# Patient Record
Sex: MALE | Race: WHITE | Employment: OTHER | URBAN - METROPOLITAN AREA
[De-identification: names, ages, dates, MRNs, and addresses within clinical notes are randomized per-mention and may not be internally consistent; named-entity substitution may affect disease eponyms.]

---

## 2017-01-09 ENCOUNTER — TELEPHONE (OUTPATIENT)
Dept: CARDIOLOGY CLINIC | Age: 72
End: 2017-01-09

## 2017-01-20 ENCOUNTER — TELEPHONE (OUTPATIENT)
Dept: ENT CLINIC | Age: 72
End: 2017-01-20

## 2017-01-20 DIAGNOSIS — J01.01 ACUTE RECURRENT MAXILLARY SINUSITIS: Primary | ICD-10-CM

## 2017-01-20 RX ORDER — CEFDINIR 300 MG/1
300 CAPSULE ORAL 2 TIMES DAILY
Qty: 20 CAPSULE | Refills: 0 | Status: SHIPPED | OUTPATIENT
Start: 2017-01-20 | End: 2017-02-01

## 2017-02-01 ENCOUNTER — TELEPHONE (OUTPATIENT)
Dept: ENT CLINIC | Age: 72
End: 2017-02-01

## 2017-02-01 ENCOUNTER — OFFICE VISIT (OUTPATIENT)
Dept: ENT CLINIC | Age: 72
End: 2017-02-01

## 2017-02-01 VITALS
DIASTOLIC BLOOD PRESSURE: 83 MMHG | HEIGHT: 72 IN | BODY MASS INDEX: 22.21 KG/M2 | RESPIRATION RATE: 18 BRPM | SYSTOLIC BLOOD PRESSURE: 151 MMHG | WEIGHT: 164 LBS

## 2017-02-01 DIAGNOSIS — J45.20 BRONCHITIS, ALLERGIC, MILD INTERMITTENT, UNCOMPLICATED: Primary | ICD-10-CM

## 2017-02-01 PROBLEM — J45.909 BRONCHITIS, ALLERGIC: Status: ACTIVE | Noted: 2017-02-01

## 2017-02-01 PROCEDURE — 1123F ACP DISCUSS/DSCN MKR DOCD: CPT | Performed by: OTOLARYNGOLOGY

## 2017-02-01 PROCEDURE — 99213 OFFICE O/P EST LOW 20 MIN: CPT | Performed by: OTOLARYNGOLOGY

## 2017-02-01 PROCEDURE — G8419 CALC BMI OUT NRM PARAM NOF/U: HCPCS | Performed by: OTOLARYNGOLOGY

## 2017-02-01 PROCEDURE — G8427 DOCREV CUR MEDS BY ELIG CLIN: HCPCS | Performed by: OTOLARYNGOLOGY

## 2017-02-01 PROCEDURE — 3017F COLORECTAL CA SCREEN DOC REV: CPT | Performed by: OTOLARYNGOLOGY

## 2017-02-01 PROCEDURE — 1036F TOBACCO NON-USER: CPT | Performed by: OTOLARYNGOLOGY

## 2017-02-01 PROCEDURE — 4040F PNEUMOC VAC/ADMIN/RCVD: CPT | Performed by: OTOLARYNGOLOGY

## 2017-02-01 PROCEDURE — G8484 FLU IMMUNIZE NO ADMIN: HCPCS | Performed by: OTOLARYNGOLOGY

## 2017-02-01 RX ORDER — METHYLPREDNISOLONE 4 MG/1
4 TABLET ORAL SEE ADMIN INSTRUCTIONS
Qty: 1 KIT | Refills: 0 | Status: SHIPPED | OUTPATIENT
Start: 2017-02-01 | End: 2017-04-19 | Stop reason: ALTCHOICE

## 2017-04-13 ENCOUNTER — HOSPITAL ENCOUNTER (OUTPATIENT)
Dept: OTHER | Age: 72
Discharge: HOME | End: 2017-04-13
Attending: INTERNAL MEDICINE | Admitting: INTERNAL MEDICINE

## 2017-04-13 LAB
A/G RATIO: 1.4 (ref 1.1–2.2)
ALBUMIN SERPL-MCNC: 4.2 G/DL (ref 3.4–5)
ALP BLD-CCNC: 71 U/L (ref 40–129)
ALT SERPL-CCNC: 30 U/L (ref 10–40)
ANION GAP SERPL CALCULATED.3IONS-SCNC: 16 MMOL/L (ref 3–16)
AST SERPL-CCNC: 30 U/L (ref 15–37)
BILIRUB SERPL-MCNC: 0.7 MG/DL (ref 0–1)
BILIRUBIN URINE: NEGATIVE
BLOOD, URINE: NEGATIVE
BUN BLDV-MCNC: 20 MG/DL (ref 7–20)
CALCIUM SERPL-MCNC: 9.3 MG/DL (ref 8.3–10.6)
CHLORIDE BLD-SCNC: 105 MMOL/L (ref 99–110)
CHOLESTEROL, TOTAL: 154 MG/DL (ref 0–199)
CLARITY: CLEAR
CO2: 23 MMOL/L (ref 21–32)
COLOR: YELLOW
CREAT SERPL-MCNC: 0.6 MG/DL (ref 0.8–1.3)
EPITHELIAL CELLS, UA: 0 /HPF (ref 0–5)
GFR AFRICAN AMERICAN: >60
GFR NON-AFRICAN AMERICAN: >60
GLOBULIN: 2.9 G/DL
GLUCOSE BLD-MCNC: 91 MG/DL (ref 70–99)
GLUCOSE URINE: NEGATIVE MG/DL
HCT VFR BLD CALC: 49.5 % (ref 40.5–52.5)
HDLC SERPL-MCNC: 27 MG/DL (ref 40–60)
HEMOGLOBIN: 16.9 G/DL (ref 13.5–17.5)
HYALINE CASTS: 0 /LPF (ref 0–8)
KETONES, URINE: NEGATIVE MG/DL
LDL CHOLESTEROL CALCULATED: 107 MG/DL
LEUKOCYTE ESTERASE, URINE: NEGATIVE
MCH RBC QN AUTO: 31.8 PG (ref 26–34)
MCHC RBC AUTO-ENTMCNC: 34.1 G/DL (ref 31–36)
MCV RBC AUTO: 93.1 FL (ref 80–100)
MICROSCOPIC EXAMINATION: YES
NITRITE, URINE: NEGATIVE
PDW BLD-RTO: 14 % (ref 12.4–15.4)
PH UA: 6
PLATELET # BLD: 146 K/UL (ref 135–450)
PMV BLD AUTO: 10 FL (ref 5–10.5)
POTASSIUM SERPL-SCNC: 4.1 MMOL/L (ref 3.5–5.1)
PROSTATE SPECIFIC ANTIGEN: <0.01 NG/ML (ref 0–4)
PROTEIN UA: 30 MG/DL
RBC # BLD: 5.31 M/UL (ref 4.2–5.9)
RBC UA: 2 /HPF (ref 0–4)
SODIUM BLD-SCNC: 144 MMOL/L (ref 136–145)
SPECIFIC GRAVITY UA: 1.02
TOTAL PROTEIN: 7.1 G/DL (ref 6.4–8.2)
TRIGL SERPL-MCNC: 99 MG/DL (ref 0–150)
URINE TYPE: ABNORMAL
UROBILINOGEN, URINE: 0.2 E.U./DL
VLDLC SERPL CALC-MCNC: 20 MG/DL
WBC # BLD: 3.5 K/UL (ref 4–11)
WBC UA: 2 /HPF (ref 0–5)

## 2017-04-15 LAB — HIV-1 AND HIV-2 ANTIBODIES: REACTIVE

## 2017-04-17 LAB — HIV-1 WESTERN BLOT: NORMAL

## 2017-04-19 ENCOUNTER — OFFICE VISIT (OUTPATIENT)
Dept: CARDIOLOGY CLINIC | Age: 72
End: 2017-04-19

## 2017-04-19 VITALS
SYSTOLIC BLOOD PRESSURE: 112 MMHG | WEIGHT: 165.7 LBS | BODY MASS INDEX: 22.44 KG/M2 | DIASTOLIC BLOOD PRESSURE: 62 MMHG | HEART RATE: 68 BPM | HEIGHT: 72 IN

## 2017-04-19 DIAGNOSIS — I10 ESSENTIAL HYPERTENSION: ICD-10-CM

## 2017-04-19 DIAGNOSIS — E78.2 MIXED HYPERLIPIDEMIA: ICD-10-CM

## 2017-04-19 DIAGNOSIS — R07.89 OTHER CHEST PAIN: Primary | ICD-10-CM

## 2017-04-19 PROCEDURE — G8427 DOCREV CUR MEDS BY ELIG CLIN: HCPCS | Performed by: NURSE PRACTITIONER

## 2017-04-19 PROCEDURE — 1036F TOBACCO NON-USER: CPT | Performed by: NURSE PRACTITIONER

## 2017-04-19 PROCEDURE — 99213 OFFICE O/P EST LOW 20 MIN: CPT | Performed by: NURSE PRACTITIONER

## 2017-04-19 PROCEDURE — 93000 ELECTROCARDIOGRAM COMPLETE: CPT | Performed by: NURSE PRACTITIONER

## 2017-04-19 PROCEDURE — 1123F ACP DISCUSS/DSCN MKR DOCD: CPT | Performed by: NURSE PRACTITIONER

## 2017-04-19 PROCEDURE — 4040F PNEUMOC VAC/ADMIN/RCVD: CPT | Performed by: NURSE PRACTITIONER

## 2017-04-19 PROCEDURE — 3017F COLORECTAL CA SCREEN DOC REV: CPT | Performed by: NURSE PRACTITIONER

## 2017-04-19 PROCEDURE — G8419 CALC BMI OUT NRM PARAM NOF/U: HCPCS | Performed by: NURSE PRACTITIONER

## 2017-04-26 ENCOUNTER — HOSPITAL ENCOUNTER (OUTPATIENT)
Dept: OTHER | Age: 72
Discharge: HOME | End: 2017-04-26
Attending: INTERNAL MEDICINE | Admitting: INTERNAL MEDICINE

## 2017-04-26 LAB — TSH SERPL DL<=0.05 MIU/L-ACNC: 1.02 UIU/ML (ref 0.27–4.2)

## 2017-05-01 LAB
HIV-1 AND HIV-2 ANTIBODIES: NORMAL
HIV-1 WESTERN BLOT: POSITIVE

## 2017-05-02 ENCOUNTER — HOSPITAL ENCOUNTER (OUTPATIENT)
Dept: PHYSICAL THERAPY | Age: 72
Discharge: HOME | End: 2017-05-31
Attending: PHYSICAL MEDICINE & REHABILITATION | Admitting: PHYSICAL MEDICINE & REHABILITATION

## 2017-05-02 DIAGNOSIS — R07.89 OTHER CHEST PAIN: ICD-10-CM

## 2017-05-02 ASSESSMENT — PAIN DESCRIPTION - PAIN TYPE: TYPE: CHRONIC PAIN

## 2017-05-02 ASSESSMENT — PAIN DESCRIPTION - DESCRIPTORS: DESCRIPTORS: ACHING;SHARP;SHOOTING

## 2017-05-02 ASSESSMENT — PAIN DESCRIPTION - ONSET: ONSET: AWAKENED FROM SLEEP

## 2017-05-02 ASSESSMENT — PAIN DESCRIPTION - FREQUENCY: FREQUENCY: CONTINUOUS

## 2017-05-02 ASSESSMENT — PAIN SCALES - GENERAL: PAINLEVEL_OUTOF10: 9

## 2017-05-03 ENCOUNTER — TELEPHONE (OUTPATIENT)
Dept: CARDIOLOGY CLINIC | Age: 72
End: 2017-05-03

## 2017-05-04 ENCOUNTER — OFFICE VISIT (OUTPATIENT)
Dept: ENT CLINIC | Age: 72
End: 2017-05-04

## 2017-05-04 VITALS
WEIGHT: 163 LBS | SYSTOLIC BLOOD PRESSURE: 136 MMHG | DIASTOLIC BLOOD PRESSURE: 81 MMHG | BODY MASS INDEX: 22.08 KG/M2 | HEART RATE: 65 BPM | HEIGHT: 72 IN

## 2017-05-04 DIAGNOSIS — G47.33 OBSTRUCTIVE APNEA: Primary | ICD-10-CM

## 2017-05-04 DIAGNOSIS — J30.9 ALLERGIC SINUSITIS: ICD-10-CM

## 2017-05-04 PROCEDURE — 99213 OFFICE O/P EST LOW 20 MIN: CPT | Performed by: OTOLARYNGOLOGY

## 2017-05-04 PROCEDURE — 3017F COLORECTAL CA SCREEN DOC REV: CPT | Performed by: OTOLARYNGOLOGY

## 2017-05-04 PROCEDURE — 1036F TOBACCO NON-USER: CPT | Performed by: OTOLARYNGOLOGY

## 2017-05-04 PROCEDURE — G8419 CALC BMI OUT NRM PARAM NOF/U: HCPCS | Performed by: OTOLARYNGOLOGY

## 2017-05-04 PROCEDURE — G8427 DOCREV CUR MEDS BY ELIG CLIN: HCPCS | Performed by: OTOLARYNGOLOGY

## 2017-05-04 PROCEDURE — 4040F PNEUMOC VAC/ADMIN/RCVD: CPT | Performed by: OTOLARYNGOLOGY

## 2017-05-04 PROCEDURE — 1123F ACP DISCUSS/DSCN MKR DOCD: CPT | Performed by: OTOLARYNGOLOGY

## 2017-05-04 PROCEDURE — 96372 THER/PROPH/DIAG INJ SC/IM: CPT | Performed by: OTOLARYNGOLOGY

## 2017-05-04 RX ORDER — METHYLPREDNISOLONE ACETATE 40 MG/ML
40 INJECTION, SUSPENSION INTRA-ARTICULAR; INTRALESIONAL; INTRAMUSCULAR; SOFT TISSUE ONCE
Status: COMPLETED | OUTPATIENT
Start: 2017-05-04 | End: 2017-05-04

## 2017-05-04 RX ORDER — MONTELUKAST SODIUM 10 MG/1
10 TABLET ORAL NIGHTLY
Qty: 30 TABLET | Refills: 0 | Status: SHIPPED | OUTPATIENT
Start: 2017-05-04 | End: 2018-04-20 | Stop reason: ALTCHOICE

## 2017-05-04 RX ADMIN — METHYLPREDNISOLONE ACETATE 40 MG: 40 INJECTION, SUSPENSION INTRA-ARTICULAR; INTRALESIONAL; INTRAMUSCULAR; SOFT TISSUE at 10:45

## 2017-05-05 ENCOUNTER — TELEPHONE (OUTPATIENT)
Dept: ENT CLINIC | Age: 72
End: 2017-05-05

## 2017-05-19 ENCOUNTER — HOSPITAL ENCOUNTER (OUTPATIENT)
Dept: OTHER | Age: 72
Discharge: HOME | End: 2017-05-19
Attending: INTERNAL MEDICINE | Admitting: INTERNAL MEDICINE

## 2017-05-22 LAB
HIV RNA PCR INTERPRETATION: DETECTED
HIV-1 RNA BY PCR, QN: 5.1 LOG
HIV-1 RNA BY PCR, QN: ABNORMAL CPY/ML

## 2018-04-20 ENCOUNTER — OFFICE VISIT (OUTPATIENT)
Dept: CARDIOLOGY CLINIC | Age: 73
End: 2018-04-20

## 2018-04-20 VITALS
DIASTOLIC BLOOD PRESSURE: 80 MMHG | SYSTOLIC BLOOD PRESSURE: 132 MMHG | BODY MASS INDEX: 22.62 KG/M2 | HEIGHT: 72 IN | WEIGHT: 167 LBS

## 2018-04-20 DIAGNOSIS — I10 ESSENTIAL HYPERTENSION: Primary | ICD-10-CM

## 2018-04-20 DIAGNOSIS — E78.2 MIXED HYPERLIPIDEMIA: ICD-10-CM

## 2018-04-20 DIAGNOSIS — R42 DIZZINESS: ICD-10-CM

## 2018-04-20 PROCEDURE — 1123F ACP DISCUSS/DSCN MKR DOCD: CPT | Performed by: NURSE PRACTITIONER

## 2018-04-20 PROCEDURE — 99214 OFFICE O/P EST MOD 30 MIN: CPT | Performed by: NURSE PRACTITIONER

## 2018-04-20 PROCEDURE — G8427 DOCREV CUR MEDS BY ELIG CLIN: HCPCS | Performed by: NURSE PRACTITIONER

## 2018-04-20 PROCEDURE — 4040F PNEUMOC VAC/ADMIN/RCVD: CPT | Performed by: NURSE PRACTITIONER

## 2018-04-20 PROCEDURE — G8420 CALC BMI NORM PARAMETERS: HCPCS | Performed by: NURSE PRACTITIONER

## 2018-04-20 PROCEDURE — 93000 ELECTROCARDIOGRAM COMPLETE: CPT | Performed by: NURSE PRACTITIONER

## 2018-04-20 PROCEDURE — 3017F COLORECTAL CA SCREEN DOC REV: CPT | Performed by: NURSE PRACTITIONER

## 2018-04-20 PROCEDURE — 1036F TOBACCO NON-USER: CPT | Performed by: NURSE PRACTITIONER

## 2018-04-20 RX ORDER — AMITRIPTYLINE HYDROCHLORIDE 25 MG/1
25 TABLET, FILM COATED ORAL NIGHTLY
COMMUNITY
End: 2018-07-18

## 2018-04-20 RX ORDER — ZOLPIDEM TARTRATE 5 MG/1
5 TABLET ORAL NIGHTLY PRN
COMMUNITY
End: 2018-07-18

## 2018-04-20 RX ORDER — DICYCLOMINE HYDROCHLORIDE 10 MG/1
10 CAPSULE ORAL PRN
COMMUNITY
End: 2018-07-18

## 2018-05-07 ENCOUNTER — HOSPITAL ENCOUNTER (OUTPATIENT)
Dept: VASCULAR LAB | Age: 73
Discharge: HOME | End: 2018-05-07
Attending: NURSE PRACTITIONER | Admitting: NURSE PRACTITIONER

## 2018-05-07 ENCOUNTER — TELEPHONE (OUTPATIENT)
Dept: CARDIOLOGY CLINIC | Age: 73
End: 2018-05-07

## 2018-05-07 DIAGNOSIS — R42 DIZZINESS: ICD-10-CM

## 2018-05-07 DIAGNOSIS — R42 DIZZINESS AND GIDDINESS: ICD-10-CM

## 2018-05-07 DIAGNOSIS — E78.2 MIXED HYPERLIPIDEMIA: Primary | Chronic | ICD-10-CM

## 2018-05-07 LAB
LEFT VENTRICULAR EJECTION FRACTION HIGH VALUE: 65 %
LEFT VENTRICULAR EJECTION FRACTION MODE: NORMAL
LV EF: 65 %
LVEF MODALITY: NORMAL

## 2018-05-07 RX ORDER — ATORVASTATIN CALCIUM 40 MG/1
40 TABLET, FILM COATED ORAL DAILY
Qty: 30 TABLET | Refills: 3 | Status: SHIPPED | OUTPATIENT
Start: 2018-05-07 | End: 2018-07-18

## 2018-05-09 ENCOUNTER — TELEPHONE (OUTPATIENT)
Dept: CARDIOLOGY CLINIC | Age: 73
End: 2018-05-09

## 2018-07-18 ENCOUNTER — OFFICE VISIT (OUTPATIENT)
Dept: ENT CLINIC | Age: 73
End: 2018-07-18

## 2018-07-18 VITALS — SYSTOLIC BLOOD PRESSURE: 130 MMHG | DIASTOLIC BLOOD PRESSURE: 80 MMHG

## 2018-07-18 DIAGNOSIS — H60.63 CHRONIC INFECTION OF BOTH EXTERNAL EARS: Primary | ICD-10-CM

## 2018-07-18 PROCEDURE — 1123F ACP DISCUSS/DSCN MKR DOCD: CPT | Performed by: OTOLARYNGOLOGY

## 2018-07-18 PROCEDURE — 99213 OFFICE O/P EST LOW 20 MIN: CPT | Performed by: OTOLARYNGOLOGY

## 2018-07-18 PROCEDURE — 1101F PT FALLS ASSESS-DOCD LE1/YR: CPT | Performed by: OTOLARYNGOLOGY

## 2018-07-18 PROCEDURE — 4040F PNEUMOC VAC/ADMIN/RCVD: CPT | Performed by: OTOLARYNGOLOGY

## 2018-07-18 PROCEDURE — 3017F COLORECTAL CA SCREEN DOC REV: CPT | Performed by: OTOLARYNGOLOGY

## 2018-07-18 PROCEDURE — G8427 DOCREV CUR MEDS BY ELIG CLIN: HCPCS | Performed by: OTOLARYNGOLOGY

## 2018-07-18 PROCEDURE — G8420 CALC BMI NORM PARAMETERS: HCPCS | Performed by: OTOLARYNGOLOGY

## 2018-07-18 PROCEDURE — 1036F TOBACCO NON-USER: CPT | Performed by: OTOLARYNGOLOGY

## 2018-07-18 RX ORDER — TRIAMCINOLONE ACETONIDE 1 MG/G
CREAM TOPICAL
Qty: 15 G | Refills: 0 | Status: SHIPPED | OUTPATIENT
Start: 2018-07-18 | End: 2019-10-28

## 2018-07-18 NOTE — PROGRESS NOTES
The past few days, the patient has noted some itching in his left ear but no drainage or pain. His hearing remains the same and he has had no tinnitus or vertigo. The right ear seems to be clear of any discomfort. He is not had this too often in the past.  Currently, he appears in no acute distress. Ear examination the right reveals some mild accumulation of cerumen which is removed the curet. The ear canal skin is rather dry. The tympanic membrane is normal in appearance. On the left side there is significant dryness to the skin which seems to be the cause of the itching. No infection is noted. The tympanic membrane is also normal.  Both ears are cleaned with peroxide and are painted with gentian violet. The oral examination is unremarkable. The nasal mucosa is mildly edematous from allergy but there is no evidence of infection or the neck is free of adenopathy, mass, thyroid enlargement. We will try a course of triamcinolone cream to be used at night on a regular basis for approximately 1 month and then be used when he needs it.

## 2018-09-27 PROBLEM — R42 DIZZINESS: Status: ACTIVE | Noted: 2018-09-27

## 2018-09-27 NOTE — PROGRESS NOTES
[address]       H+P // CONSULT // OUTPATIENT VISIT // Td Gonzalez     Referring Doctor Nicolasa Salcido MD   Encounter Type Followup     CHIEF COMPLAINT     VisitType [] Acute [x] Chronic     Symptom [x] None [] CP [] SOB [] Dizzy [] Palps [] Fatigue     Problems Dizzy, HTN, CHOL      HISTORY OF PRESENT ILLNESS     Doing well. Denies cp, sob. Compliant with meds. Symptom Y N Frequency Duration Severity Modify Assoc Sx Other   CP [] [x]         SOB [] [x]         Dizzy [] [x]         Syncope [] [x]         Palpitations [] [x]           COMPLIANCE     Category Meds Diet Salt Exercise Tobacco Alcohol Drugs   Compliant [x] [x] [x] [x] [x] [x] [x]   [x]Counseling given on all above above categories    HISTORY/ALLERGIES/ROS     MedHx:  has a past medical history of Chronic kidney disease; Dizziness, nonspecific; Gall stones; Heart murmur; Hyperlipidemia; Hypertension; Prostate cancer ([facility] [address] and Unspecified sleep apnea. SurgHx:  has a past surgical history that includes Prostatectomy; Neck surgery; Tonsillectomy and adenoidectomy; Colonoscopy; Vasectomy; Upper gastrointestinal endoscopy; and penile prosthesis (12/17/13). SocHx:   reports that he has never smoked. He has never used smokeless tobacco. He reports that he does not drink alcohol or use drugs. FamHx: family history includes Heart Disease in his father and paternal grandmother. Allergies: Simvastatin and Pristiq [desvenlafaxine]   ROS:  [x]Full ROS obtained and negative except as mentioned in HPI     MEDICATIONS      Current Outpatient Prescriptions   Medication Sig Dispense Refill    triamcinolone (KENALOG) 0.1 % cream Apply topically daily. Apply with Q tip in each ear at bedtime 15 g 0     No current facility-administered medications for this visit.       Reviewed with patient and will remain unchanged except as mentioned in A/P  PHYSICAL EXAM     Vitals:    10/01/18 0827   BP: 120/80   Pulse: 61   Resp: 16   SpO2: 97%      Gen

## 2018-10-01 ENCOUNTER — OFFICE VISIT (OUTPATIENT)
Dept: CARDIOLOGY CLINIC | Age: 73
End: 2018-10-01
Payer: MEDICARE

## 2018-10-01 VITALS
BODY MASS INDEX: 22.21 KG/M2 | SYSTOLIC BLOOD PRESSURE: 120 MMHG | RESPIRATION RATE: 16 BRPM | HEART RATE: 61 BPM | HEIGHT: 72 IN | WEIGHT: 164 LBS | OXYGEN SATURATION: 97 % | DIASTOLIC BLOOD PRESSURE: 80 MMHG

## 2018-10-01 DIAGNOSIS — R42 DIZZINESS: Primary | ICD-10-CM

## 2018-10-01 DIAGNOSIS — E78.00 HYPERCHOLESTEREMIA: ICD-10-CM

## 2018-10-01 DIAGNOSIS — I10 ESSENTIAL HYPERTENSION: ICD-10-CM

## 2018-10-01 PROCEDURE — G8420 CALC BMI NORM PARAMETERS: HCPCS | Performed by: INTERNAL MEDICINE

## 2018-10-01 PROCEDURE — G8427 DOCREV CUR MEDS BY ELIG CLIN: HCPCS | Performed by: INTERNAL MEDICINE

## 2018-10-01 PROCEDURE — 99214 OFFICE O/P EST MOD 30 MIN: CPT | Performed by: INTERNAL MEDICINE

## 2018-10-01 PROCEDURE — 1036F TOBACCO NON-USER: CPT | Performed by: INTERNAL MEDICINE

## 2018-10-01 PROCEDURE — 3017F COLORECTAL CA SCREEN DOC REV: CPT | Performed by: INTERNAL MEDICINE

## 2018-10-01 PROCEDURE — 4040F PNEUMOC VAC/ADMIN/RCVD: CPT | Performed by: INTERNAL MEDICINE

## 2018-10-01 PROCEDURE — 1101F PT FALLS ASSESS-DOCD LE1/YR: CPT | Performed by: INTERNAL MEDICINE

## 2018-10-01 PROCEDURE — G8484 FLU IMMUNIZE NO ADMIN: HCPCS | Performed by: INTERNAL MEDICINE

## 2018-10-01 PROCEDURE — 1123F ACP DISCUSS/DSCN MKR DOCD: CPT | Performed by: INTERNAL MEDICINE

## 2018-10-01 NOTE — COMMUNICATION BODY
[address]       H+P // CONSULT // OUTPATIENT VISIT // Teresa Harrison     Referring Doctor Marianna Ryan MD   Encounter Type Followup     CHIEF COMPLAINT     VisitType [] Acute [x] Chronic     Symptom [x] None [] CP [] SOB [] Dizzy [] Palps [] Fatigue     Problems Dizzy, HTN, CHOL      HISTORY OF PRESENT ILLNESS     Doing well. Denies cp, sob. Compliant with meds. Symptom Y N Frequency Duration Severity Modify Assoc Sx Other   CP [] [x]         SOB [] [x]         Dizzy [] [x]         Syncope [] [x]         Palpitations [] [x]           COMPLIANCE     Category Meds Diet Salt Exercise Tobacco Alcohol Drugs   Compliant [x] [x] [x] [x] [x] [x] [x]   [x]Counseling given on all above above categories    HISTORY/ALLERGIES/ROS     MedHx:  has a past medical history of Chronic kidney disease; Dizziness, nonspecific; Gall stones; Heart murmur; Hyperlipidemia; Hypertension; Prostate cancer ([facility] [address] and Unspecified sleep apnea. SurgHx:  has a past surgical history that includes Prostatectomy; Neck surgery; Tonsillectomy and adenoidectomy; Colonoscopy; Vasectomy; Upper gastrointestinal endoscopy; and penile prosthesis (12/17/13). SocHx:   reports that he has never smoked. He has never used smokeless tobacco. He reports that he does not drink alcohol or use drugs. FamHx: family history includes Heart Disease in his father and paternal grandmother. Allergies: Simvastatin and Pristiq [desvenlafaxine]   ROS:  [x]Full ROS obtained and negative except as mentioned in HPI     MEDICATIONS      Current Outpatient Prescriptions   Medication Sig Dispense Refill    triamcinolone (KENALOG) 0.1 % cream Apply topically daily. Apply with Q tip in each ear at bedtime 15 g 0     No current facility-administered medications for this visit.       Reviewed with patient and will remain unchanged except as mentioned in A/P  PHYSICAL EXAM     Vitals:    10/01/18 0827   BP: 120/80   Pulse: 61   Resp: 16   SpO2: 97%      Gen

## 2019-09-27 ENCOUNTER — HOSPITAL ENCOUNTER (OUTPATIENT)
Age: 74
Discharge: HOME | End: 2019-09-27
Payer: MEDICARE

## 2019-09-27 LAB
A/G RATIO: 1.6 (ref 1.1–2.2)
ALBUMIN SERPL-MCNC: 4.7 G/DL (ref 3.4–5)
ALP BLD-CCNC: 67 U/L (ref 40–129)
ALT SERPL-CCNC: 23 U/L (ref 10–40)
ANION GAP SERPL CALCULATED.3IONS-SCNC: 15 MMOL/L (ref 3–16)
AST SERPL-CCNC: 25 U/L (ref 15–37)
BILIRUB SERPL-MCNC: 0.7 MG/DL (ref 0–1)
BILIRUBIN URINE: NEGATIVE
BLOOD, URINE: NEGATIVE
BUN BLDV-MCNC: 19 MG/DL (ref 7–20)
CALCIUM SERPL-MCNC: 9.6 MG/DL (ref 8.3–10.6)
CHLORIDE BLD-SCNC: 105 MMOL/L (ref 99–110)
CHOLESTEROL, TOTAL: 213 MG/DL (ref 0–199)
CLARITY: CLEAR
CO2: 25 MMOL/L (ref 21–32)
COLOR: YELLOW
CREAT SERPL-MCNC: 0.9 MG/DL (ref 0.8–1.3)
EPITHELIAL CELLS, UA: 0 /HPF (ref 0–5)
GFR AFRICAN AMERICAN: >60
GFR NON-AFRICAN AMERICAN: >60
GLOBULIN: 3 G/DL
GLUCOSE BLD-MCNC: 85 MG/DL (ref 70–99)
GLUCOSE URINE: NEGATIVE MG/DL
HCT VFR BLD CALC: 49.3 % (ref 40.5–52.5)
HDLC SERPL-MCNC: 35 MG/DL (ref 40–60)
HEMOGLOBIN: 17.1 G/DL (ref 13.5–17.5)
HYALINE CASTS: 1 /LPF (ref 0–8)
KETONES, URINE: NEGATIVE MG/DL
LDL CHOLESTEROL CALCULATED: 150 MG/DL
LEUKOCYTE ESTERASE, URINE: NEGATIVE
MCH RBC QN AUTO: 34.6 PG (ref 26–34)
MCHC RBC AUTO-ENTMCNC: 34.7 G/DL (ref 31–36)
MCV RBC AUTO: 99.7 FL (ref 80–100)
MICROSCOPIC EXAMINATION: YES
NITRITE, URINE: NEGATIVE
PDW BLD-RTO: 14.3 % (ref 12.4–15.4)
PH UA: 5.5 (ref 5–8)
PLATELET # BLD: 171 K/UL (ref 135–450)
PMV BLD AUTO: 9.2 FL (ref 5–10.5)
POTASSIUM SERPL-SCNC: 4.4 MMOL/L (ref 3.5–5.1)
PROSTATE SPECIFIC ANTIGEN: 0.02 NG/ML (ref 0–4)
PROTEIN UA: ABNORMAL MG/DL
RBC # BLD: 4.95 M/UL (ref 4.2–5.9)
RBC UA: 3 /HPF (ref 0–4)
SODIUM BLD-SCNC: 145 MMOL/L (ref 136–145)
SPECIFIC GRAVITY UA: 1.03 (ref 1–1.03)
TOTAL PROTEIN: 7.7 G/DL (ref 6.4–8.2)
TRIGL SERPL-MCNC: 141 MG/DL (ref 0–150)
URINE TYPE: ABNORMAL
UROBILINOGEN, URINE: 0.2 E.U./DL
VITAMIN D 25-HYDROXY: 36 NG/ML
VLDLC SERPL CALC-MCNC: 28 MG/DL
WBC # BLD: 4.2 K/UL (ref 4–11)
WBC UA: 1 /HPF (ref 0–5)

## 2019-09-27 PROCEDURE — 36415 COLL VENOUS BLD VENIPUNCTURE: CPT

## 2019-09-27 PROCEDURE — 82306 VITAMIN D 25 HYDROXY: CPT

## 2019-09-27 PROCEDURE — 80061 LIPID PANEL: CPT

## 2019-09-27 PROCEDURE — 81001 URINALYSIS AUTO W/SCOPE: CPT

## 2019-09-27 PROCEDURE — 80053 COMPREHEN METABOLIC PANEL: CPT

## 2019-09-27 PROCEDURE — 85027 COMPLETE CBC AUTOMATED: CPT

## 2019-09-27 PROCEDURE — 84153 ASSAY OF PSA TOTAL: CPT

## 2019-10-11 ENCOUNTER — OFFICE VISIT (OUTPATIENT)
Dept: CARDIOLOGY CLINIC | Age: 74
End: 2019-10-11
Payer: MEDICARE

## 2019-10-11 VITALS
HEIGHT: 72 IN | OXYGEN SATURATION: 97 % | RESPIRATION RATE: 18 BRPM | BODY MASS INDEX: 21.4 KG/M2 | HEART RATE: 68 BPM | WEIGHT: 158 LBS | DIASTOLIC BLOOD PRESSURE: 78 MMHG | SYSTOLIC BLOOD PRESSURE: 124 MMHG

## 2019-10-11 DIAGNOSIS — I10 ESSENTIAL HYPERTENSION: ICD-10-CM

## 2019-10-11 DIAGNOSIS — R42 DIZZINESS: Primary | ICD-10-CM

## 2019-10-11 DIAGNOSIS — E78.00 HYPERCHOLESTEREMIA: ICD-10-CM

## 2019-10-11 PROCEDURE — 99214 OFFICE O/P EST MOD 30 MIN: CPT | Performed by: INTERNAL MEDICINE

## 2019-10-11 PROCEDURE — 1036F TOBACCO NON-USER: CPT | Performed by: INTERNAL MEDICINE

## 2019-10-11 PROCEDURE — G8484 FLU IMMUNIZE NO ADMIN: HCPCS | Performed by: INTERNAL MEDICINE

## 2019-10-11 PROCEDURE — G8420 CALC BMI NORM PARAMETERS: HCPCS | Performed by: INTERNAL MEDICINE

## 2019-10-11 PROCEDURE — G8427 DOCREV CUR MEDS BY ELIG CLIN: HCPCS | Performed by: INTERNAL MEDICINE

## 2019-10-11 PROCEDURE — 4040F PNEUMOC VAC/ADMIN/RCVD: CPT | Performed by: INTERNAL MEDICINE

## 2019-10-11 PROCEDURE — 3017F COLORECTAL CA SCREEN DOC REV: CPT | Performed by: INTERNAL MEDICINE

## 2019-10-11 PROCEDURE — 1123F ACP DISCUSS/DSCN MKR DOCD: CPT | Performed by: INTERNAL MEDICINE

## 2019-10-11 RX ORDER — ZOLPIDEM TARTRATE 10 MG/1
TABLET ORAL
COMMUNITY
Start: 2019-09-27

## 2019-10-11 RX ORDER — ESOMEPRAZOLE MAGNESIUM 40 MG/1
CAPSULE, DELAYED RELEASE ORAL
COMMUNITY
Start: 2019-09-27

## 2019-10-11 RX ORDER — HYDROXYZINE HYDROCHLORIDE 25 MG/1
TABLET, FILM COATED ORAL
COMMUNITY
Start: 2019-09-27

## 2019-10-11 RX ORDER — DICYCLOMINE HYDROCHLORIDE 10 MG/1
CAPSULE ORAL
COMMUNITY
Start: 2019-01-03

## 2019-10-16 ENCOUNTER — HOSPITAL ENCOUNTER (OUTPATIENT)
Dept: CT IMAGING | Age: 74
Discharge: HOME | End: 2019-10-16
Payer: MEDICARE

## 2019-10-16 DIAGNOSIS — R42 POSTURAL DIZZINESS WITH NEAR SYNCOPE: ICD-10-CM

## 2019-10-16 DIAGNOSIS — R55 POSTURAL DIZZINESS WITH NEAR SYNCOPE: ICD-10-CM

## 2019-10-16 PROCEDURE — 6360000004 HC RX CONTRAST MEDICATION: Performed by: INTERNAL MEDICINE

## 2019-10-16 PROCEDURE — 70498 CT ANGIOGRAPHY NECK: CPT

## 2019-10-16 RX ADMIN — IOPAMIDOL 75 ML: 755 INJECTION, SOLUTION INTRAVENOUS at 07:16

## 2019-10-28 ENCOUNTER — OFFICE VISIT (OUTPATIENT)
Dept: ENT CLINIC | Age: 74
End: 2019-10-28
Payer: MEDICARE

## 2019-10-28 VITALS — DIASTOLIC BLOOD PRESSURE: 72 MMHG | SYSTOLIC BLOOD PRESSURE: 114 MMHG | HEART RATE: 60 BPM | OXYGEN SATURATION: 96 %

## 2019-10-28 DIAGNOSIS — H60.8X2 CHRONIC ECZEMATOUS OTITIS EXTERNA OF LEFT EAR: Primary | ICD-10-CM

## 2019-10-28 PROCEDURE — 99213 OFFICE O/P EST LOW 20 MIN: CPT | Performed by: OTOLARYNGOLOGY

## 2019-10-28 PROCEDURE — G8427 DOCREV CUR MEDS BY ELIG CLIN: HCPCS | Performed by: OTOLARYNGOLOGY

## 2019-10-28 PROCEDURE — G8420 CALC BMI NORM PARAMETERS: HCPCS | Performed by: OTOLARYNGOLOGY

## 2019-10-28 PROCEDURE — 3017F COLORECTAL CA SCREEN DOC REV: CPT | Performed by: OTOLARYNGOLOGY

## 2019-10-28 PROCEDURE — 1036F TOBACCO NON-USER: CPT | Performed by: OTOLARYNGOLOGY

## 2019-10-28 PROCEDURE — G8484 FLU IMMUNIZE NO ADMIN: HCPCS | Performed by: OTOLARYNGOLOGY

## 2019-10-28 PROCEDURE — 1123F ACP DISCUSS/DSCN MKR DOCD: CPT | Performed by: OTOLARYNGOLOGY

## 2019-10-28 PROCEDURE — 4040F PNEUMOC VAC/ADMIN/RCVD: CPT | Performed by: OTOLARYNGOLOGY

## 2019-10-28 RX ORDER — TRIAMCINOLONE ACETONIDE 1 MG/G
CREAM TOPICAL
Qty: 15 G | Refills: 1 | Status: SHIPPED | OUTPATIENT
Start: 2019-10-28

## 2020-04-21 ENCOUNTER — HOSPITAL ENCOUNTER (OUTPATIENT)
Dept: CT IMAGING | Age: 75
Discharge: HOME | End: 2020-04-21
Payer: MEDICARE

## 2020-04-21 LAB
A/G RATIO: 1.7 (ref 1.1–2.2)
ALBUMIN SERPL-MCNC: 4.5 G/DL (ref 3.4–5)
ALP BLD-CCNC: 79 U/L (ref 40–129)
ALT SERPL-CCNC: 22 U/L (ref 10–40)
ANION GAP SERPL CALCULATED.3IONS-SCNC: 9 MMOL/L (ref 3–16)
AST SERPL-CCNC: 20 U/L (ref 15–37)
BASOPHILS ABSOLUTE: 0 K/UL (ref 0–0.2)
BASOPHILS RELATIVE PERCENT: 0.5 %
BILIRUB SERPL-MCNC: 1.2 MG/DL (ref 0–1)
BUN BLDV-MCNC: 19 MG/DL (ref 7–20)
CALCIUM SERPL-MCNC: 9.6 MG/DL (ref 8.3–10.6)
CHLORIDE BLD-SCNC: 104 MMOL/L (ref 99–110)
CO2: 28 MMOL/L (ref 21–32)
CREAT SERPL-MCNC: 0.9 MG/DL (ref 0.8–1.3)
EOSINOPHILS ABSOLUTE: 0.1 K/UL (ref 0–0.6)
EOSINOPHILS RELATIVE PERCENT: 0.9 %
GFR AFRICAN AMERICAN: >60
GFR NON-AFRICAN AMERICAN: >60
GLOBULIN: 2.7 G/DL
GLUCOSE BLD-MCNC: 106 MG/DL (ref 70–99)
HCT VFR BLD CALC: 49.4 % (ref 40.5–52.5)
HEMOGLOBIN: 17 G/DL (ref 13.5–17.5)
LIPASE: 27 U/L (ref 13–60)
LYMPHOCYTES ABSOLUTE: 1.5 K/UL (ref 1–5.1)
LYMPHOCYTES RELATIVE PERCENT: 17.2 %
MAGNESIUM: 2.2 MG/DL (ref 1.8–2.4)
MCH RBC QN AUTO: 34.2 PG (ref 26–34)
MCHC RBC AUTO-ENTMCNC: 34.5 G/DL (ref 31–36)
MCV RBC AUTO: 99.2 FL (ref 80–100)
MONOCYTES ABSOLUTE: 0.7 K/UL (ref 0–1.3)
MONOCYTES RELATIVE PERCENT: 8.2 %
NEUTROPHILS ABSOLUTE: 6.5 K/UL (ref 1.7–7.7)
NEUTROPHILS RELATIVE PERCENT: 73.2 %
PDW BLD-RTO: 14.3 % (ref 12.4–15.4)
PLATELET # BLD: 184 K/UL (ref 135–450)
PMV BLD AUTO: 8.7 FL (ref 5–10.5)
POTASSIUM SERPL-SCNC: 4.9 MMOL/L (ref 3.5–5.1)
RBC # BLD: 4.98 M/UL (ref 4.2–5.9)
SODIUM BLD-SCNC: 141 MMOL/L (ref 136–145)
TOTAL PROTEIN: 7.2 G/DL (ref 6.4–8.2)
WBC # BLD: 8.9 K/UL (ref 4–11)

## 2020-04-21 PROCEDURE — 74177 CT ABD & PELVIS W/CONTRAST: CPT

## 2020-04-21 PROCEDURE — 83690 ASSAY OF LIPASE: CPT

## 2020-04-21 PROCEDURE — 6360000004 HC RX CONTRAST MEDICATION: Performed by: INTERNAL MEDICINE

## 2020-04-21 PROCEDURE — 80053 COMPREHEN METABOLIC PANEL: CPT

## 2020-04-21 PROCEDURE — 83735 ASSAY OF MAGNESIUM: CPT

## 2020-04-21 PROCEDURE — 36415 COLL VENOUS BLD VENIPUNCTURE: CPT

## 2020-04-21 PROCEDURE — 85025 COMPLETE CBC W/AUTO DIFF WBC: CPT

## 2020-04-21 RX ADMIN — IOPAMIDOL 75 ML: 755 INJECTION, SOLUTION INTRAVENOUS at 12:57

## 2020-04-21 RX ADMIN — IOHEXOL 50 ML: 240 INJECTION, SOLUTION INTRATHECAL; INTRAVASCULAR; INTRAVENOUS; ORAL at 12:57

## 2020-08-27 ENCOUNTER — HOSPITAL ENCOUNTER (OUTPATIENT)
Age: 75
Discharge: HOME | End: 2020-08-27
Payer: MEDICARE

## 2020-08-27 LAB
A/G RATIO: 1.8 (ref 1.1–2.2)
ALBUMIN SERPL-MCNC: 4.6 G/DL (ref 3.4–5)
ALP BLD-CCNC: 57 U/L (ref 40–129)
ALT SERPL-CCNC: 17 U/L (ref 10–40)
ANION GAP SERPL CALCULATED.3IONS-SCNC: 10 MMOL/L (ref 3–16)
AST SERPL-CCNC: 20 U/L (ref 15–37)
BILIRUB SERPL-MCNC: 0.6 MG/DL (ref 0–1)
BILIRUBIN URINE: NEGATIVE
BLOOD, URINE: NEGATIVE
BUN BLDV-MCNC: 16 MG/DL (ref 7–20)
CALCIUM SERPL-MCNC: 9.6 MG/DL (ref 8.3–10.6)
CHLORIDE BLD-SCNC: 107 MMOL/L (ref 99–110)
CHOLESTEROL, TOTAL: 205 MG/DL (ref 0–199)
CLARITY: CLEAR
CO2: 27 MMOL/L (ref 21–32)
COLOR: YELLOW
CREAT SERPL-MCNC: 0.8 MG/DL (ref 0.8–1.3)
EPITHELIAL CELLS, UA: 0 /HPF (ref 0–5)
GFR AFRICAN AMERICAN: >60
GFR NON-AFRICAN AMERICAN: >60
GLOBULIN: 2.6 G/DL
GLUCOSE BLD-MCNC: 97 MG/DL (ref 70–99)
GLUCOSE URINE: NEGATIVE MG/DL
HCT VFR BLD CALC: 48.8 % (ref 40.5–52.5)
HDLC SERPL-MCNC: 39 MG/DL (ref 40–60)
HEMOGLOBIN: 16.4 G/DL (ref 13.5–17.5)
HYALINE CASTS: 0 /LPF (ref 0–8)
KETONES, URINE: NEGATIVE MG/DL
LDL CHOLESTEROL CALCULATED: 146 MG/DL
LEUKOCYTE ESTERASE, URINE: NEGATIVE
MCH RBC QN AUTO: 34.1 PG (ref 26–34)
MCHC RBC AUTO-ENTMCNC: 33.7 G/DL (ref 31–36)
MCV RBC AUTO: 101.3 FL (ref 80–100)
MICROSCOPIC EXAMINATION: YES
NITRITE, URINE: NEGATIVE
PDW BLD-RTO: 14.4 % (ref 12.4–15.4)
PH UA: 5.5 (ref 5–8)
PLATELET # BLD: 175 K/UL (ref 135–450)
PMV BLD AUTO: 9.2 FL (ref 5–10.5)
POTASSIUM SERPL-SCNC: 5.1 MMOL/L (ref 3.5–5.1)
PROSTATE SPECIFIC ANTIGEN: <0.01 NG/ML (ref 0–4)
PROTEIN UA: ABNORMAL MG/DL
RBC # BLD: 4.81 M/UL (ref 4.2–5.9)
RBC UA: 3 /HPF (ref 0–4)
SODIUM BLD-SCNC: 144 MMOL/L (ref 136–145)
SPECIFIC GRAVITY UA: 1.02 (ref 1–1.03)
TOTAL PROTEIN: 7.2 G/DL (ref 6.4–8.2)
TRIGL SERPL-MCNC: 101 MG/DL (ref 0–150)
URINE TYPE: ABNORMAL
UROBILINOGEN, URINE: 0.2 E.U./DL
VITAMIN D 25-HYDROXY: 44 NG/ML
VLDLC SERPL CALC-MCNC: 20 MG/DL
WBC # BLD: 4.4 K/UL (ref 4–11)
WBC UA: 1 /HPF (ref 0–5)

## 2020-08-27 PROCEDURE — 80053 COMPREHEN METABOLIC PANEL: CPT

## 2020-08-27 PROCEDURE — 82306 VITAMIN D 25 HYDROXY: CPT

## 2020-08-27 PROCEDURE — 80061 LIPID PANEL: CPT

## 2020-08-27 PROCEDURE — 84153 ASSAY OF PSA TOTAL: CPT

## 2020-08-27 PROCEDURE — 85027 COMPLETE CBC AUTOMATED: CPT

## 2020-08-27 PROCEDURE — 81001 URINALYSIS AUTO W/SCOPE: CPT

## 2020-10-22 NOTE — PROGRESS NOTES
[address]     H+P // CONSULT // OUTPATIENT VISIT // Mario Emmanuel     Referring Doctor Henry Rodriguez MD   Encounter Type Followup     CHIEF COMPLAINT     Visit Type Chronic   Symptoms None   Problems Dizziness, HTN, CHOL     HISTORY OF PRESENT ILLNESS      GEN - Doing well. No cp, sob.  Dizziness - improved, CT neck neg.  HTN - Ambulatory BP readings in good range. No HA or dizziness.  CHOL - Last cholesterol reviewed and in good range. Declines statin   MED - Compliant with CV meds listed below without notable side effects. HISTORY/ALLERGIES/ROS     MedHx:  has a past medical history of Chronic kidney disease, Dizziness, nonspecific, Gall stones, Heart murmur, Hyperlipidemia, Hypertension, Prostate cancer ([facility] [address] and Unspecified sleep apnea. SurgHx:  has a past surgical history that includes Prostatectomy; Neck surgery; Tonsillectomy and adenoidectomy; Colonoscopy; Vasectomy; Upper gastrointestinal endoscopy; and penile prosthesis (12/17/13). SocHx:  reports that he has never smoked. He has never used smokeless tobacco. He reports that he does not drink alcohol or use drugs. FamHx: family history includes Heart Disease in his father and paternal grandmother. Allerg: Simvastatin and Pristiq [desvenlafaxine]   ROS: [x]Full ROS obtained and negative except as mentioned in HPI     MEDICATIONS      Current Outpatient Medications   Medication Sig Dispense Refill    triamcinolone (KENALOG) 0.1 % cream Apply topically  Daily. Apply with Q tip in AS nightly 15 g 1    esomeprazole (NEXIUM) 40 MG delayed release capsule       Abacavir-Dolutegravir-Lamivud (TRIUMEQ) 600- MG TABS TAKE 1 TABLET BY MOUTH  EVERY DAY      zolpidem (AMBIEN) 10 MG tablet       hydrOXYzine (ATARAX) 25 MG tablet       dicyclomine (BENTYL) 10 MG capsule TK 1 C PO BID PRN       No current facility-administered medications for this visit.       Reviewed with patient and will remain unchanged except as mentioned in A/P  PHYSICAL EXAM     Vitals:    10/29/20 1411   BP: 130/62   Pulse: 67   SpO2: 96%      Gen Alert, coop, no distress Heart  Rrr, no mrg   Head NC, AT, no abnorm Abd  Soft, NT, +BS, no mass, no OM   Eyes PER, conj/corn clear Ext  Ext nl, AT, no C/C/E   Nose Nares nl, no drain, NT Pulse 2+ and symmetric   Throat Lips, mucosa, tongue nl Skin Col/text/turg nl, no vis rash/les   Neck S/S, TM, NT, no bruit/JVD Psych Nl mood and affect   Lung CTA-B, unlabored, no DTP Lymph   No cervical or axillary LA   Ch wall NT, no deform Neuro  Nl gross M/S exam     ASSESSMENT AND PLAN     *Dizziness/visual changes/right facial numbness  Status Resolved, ECHO: 5/18 EF: 65%  Plan: Continued observation  *HTN  Status Controlled  Plan Counseled on diet/salt/exercise/weight, continue meds at doses above  *CHOL  Status  Uncontrolled with last LDL of 146 (goal <70) and HDL of 39 (8/20)  Plan Counseled on diet/exercise/weight, lipid/liver surveillance per PCP  *COMPLIANCE  Status Compliant  Plan Discussed importance of compliance with meds/diet/salt/exercise; avoid tob/alc/drugs; patient verbalized understanding  *FOLLOWUP  12 months    [address] Linda John, am scribing for and in the presence of Nathaniel Morel MD.   Linda Perkins 10/22/20 8:50 AM   Provider Jane Peterson is working as a scribe for and in the presence of me (Nathaniel Morel MD). Working as a scribe, Linda Joy may have prepopulated components of this note with my historical  intellectual property under my direct supervision. Any additions to this intellectual property were performed in my presence and at my direction. Furthermore, the content and accuracy of this note have been reviewed by me Nathaniel Morel MD).   10/29/2020 8:21 AM

## 2020-10-29 ENCOUNTER — OFFICE VISIT (OUTPATIENT)
Dept: CARDIOLOGY CLINIC | Age: 75
End: 2020-10-29
Payer: MEDICARE

## 2020-10-29 VITALS
SYSTOLIC BLOOD PRESSURE: 130 MMHG | WEIGHT: 154 LBS | HEIGHT: 72 IN | DIASTOLIC BLOOD PRESSURE: 62 MMHG | HEART RATE: 67 BPM | OXYGEN SATURATION: 96 % | BODY MASS INDEX: 20.86 KG/M2

## 2020-10-29 PROCEDURE — 1036F TOBACCO NON-USER: CPT | Performed by: INTERNAL MEDICINE

## 2020-10-29 PROCEDURE — 4040F PNEUMOC VAC/ADMIN/RCVD: CPT | Performed by: INTERNAL MEDICINE

## 2020-10-29 PROCEDURE — 3017F COLORECTAL CA SCREEN DOC REV: CPT | Performed by: INTERNAL MEDICINE

## 2020-10-29 PROCEDURE — G8427 DOCREV CUR MEDS BY ELIG CLIN: HCPCS | Performed by: INTERNAL MEDICINE

## 2020-10-29 PROCEDURE — 1123F ACP DISCUSS/DSCN MKR DOCD: CPT | Performed by: INTERNAL MEDICINE

## 2020-10-29 PROCEDURE — G8420 CALC BMI NORM PARAMETERS: HCPCS | Performed by: INTERNAL MEDICINE

## 2020-10-29 PROCEDURE — 99213 OFFICE O/P EST LOW 20 MIN: CPT | Performed by: INTERNAL MEDICINE

## 2020-10-29 PROCEDURE — G8484 FLU IMMUNIZE NO ADMIN: HCPCS | Performed by: INTERNAL MEDICINE

## 2020-11-12 ENCOUNTER — TELEPHONE (OUTPATIENT)
Dept: ENT CLINIC | Age: 75
End: 2020-11-12

## 2020-11-12 ENCOUNTER — OFFICE VISIT (OUTPATIENT)
Dept: ENT CLINIC | Age: 75
End: 2020-11-12
Payer: MEDICARE

## 2020-11-12 VITALS
HEIGHT: 72 IN | OXYGEN SATURATION: 98 % | SYSTOLIC BLOOD PRESSURE: 160 MMHG | DIASTOLIC BLOOD PRESSURE: 74 MMHG | TEMPERATURE: 97.8 F | WEIGHT: 156 LBS | HEART RATE: 60 BPM | BODY MASS INDEX: 21.13 KG/M2

## 2020-11-12 PROCEDURE — 99213 OFFICE O/P EST LOW 20 MIN: CPT | Performed by: OTOLARYNGOLOGY

## 2020-11-12 PROCEDURE — 3017F COLORECTAL CA SCREEN DOC REV: CPT | Performed by: OTOLARYNGOLOGY

## 2020-11-12 PROCEDURE — 96372 THER/PROPH/DIAG INJ SC/IM: CPT | Performed by: OTOLARYNGOLOGY

## 2020-11-12 PROCEDURE — G8427 DOCREV CUR MEDS BY ELIG CLIN: HCPCS | Performed by: OTOLARYNGOLOGY

## 2020-11-12 PROCEDURE — 4040F PNEUMOC VAC/ADMIN/RCVD: CPT | Performed by: OTOLARYNGOLOGY

## 2020-11-12 PROCEDURE — G8484 FLU IMMUNIZE NO ADMIN: HCPCS | Performed by: OTOLARYNGOLOGY

## 2020-11-12 PROCEDURE — 1123F ACP DISCUSS/DSCN MKR DOCD: CPT | Performed by: OTOLARYNGOLOGY

## 2020-11-12 PROCEDURE — 1036F TOBACCO NON-USER: CPT | Performed by: OTOLARYNGOLOGY

## 2020-11-12 PROCEDURE — G8420 CALC BMI NORM PARAMETERS: HCPCS | Performed by: OTOLARYNGOLOGY

## 2020-11-12 RX ORDER — METHYLPREDNISOLONE ACETATE 40 MG/ML
40 INJECTION, SUSPENSION INTRA-ARTICULAR; INTRALESIONAL; INTRAMUSCULAR; SOFT TISSUE ONCE
Status: COMPLETED | OUTPATIENT
Start: 2020-11-12 | End: 2020-11-12

## 2020-11-12 RX ORDER — MONTELUKAST SODIUM 10 MG/1
10 TABLET ORAL NIGHTLY
Qty: 15 TABLET | Refills: 1 | Status: SHIPPED | OUTPATIENT
Start: 2020-11-12

## 2020-11-12 RX ADMIN — METHYLPREDNISOLONE ACETATE 40 MG: 40 INJECTION, SUSPENSION INTRA-ARTICULAR; INTRALESIONAL; INTRAMUSCULAR; SOFT TISSUE at 10:45

## 2020-11-12 NOTE — PROGRESS NOTES
Patient has been having a problem with gastric reflux for which she is on Nexium therapy as well as elevating his head of his bed at night. He has been doing reasonably well but is now having excessive amounts of drainage which likely is contributing to his current problem. He has had sinus problems in the past.  This problem has been occurring over the past several weeks. He has had no fever. No purulent drainage or nasal obstruction has been noted. There is no shortness of breath noted as well. He does not smoke. Currently, he appears in no acute distress. Ear examination reveals some cerumen present on the right side removed with a curette. The tympanic membranes on both sides appear normal and the ear canals are both now clear. Oral examination is unremarkable. Indirect laryngoscopy is the neck is negative except for some mild inflammation in the posterior arytenoid area likely related to the reflux. No other abnormalities are apparent. The neck is free of adenopathy, mass, thyroid enlargement. Nasal mucosa treated with cotton pledgets  impregnated with Afrin and lidocaine placed in middle meatuses against turbinates. Pledgets left in place for five minutes and removed enabling enhanced visualization. The exam revealed positive edema of mucosa. it was negative for erythema indicative of infection. There was not evidence of deviation of the septum which was not significant. There were not polyps present. .There were not other masses present. The turbinates were not enlarged beyond normal.  Findings are consistent with some allergic sinusitis likely producing the drainage causing his discomfort. We will try a course of Singulair and asked that he use his Flonase nasal spray daily at least for the next 2 weeks. A Depo-Medrol injection will be given and I have asked that he contact me in 2 weeks to determine his response.

## 2020-11-12 NOTE — TELEPHONE ENCOUNTER
PT was in office to see Dr Rolando Nicole today was prescribed Singulair. He went to the [address] [place] [address] to  the medication and was told that he medication was not supposed to be filled until tomorrow. PT thought is was to start the medication tonight. PT would like to have a return call regarding this issue.    #[phone]

## 2020-11-13 ENCOUNTER — TELEPHONE (OUTPATIENT)
Dept: ENT CLINIC | Age: 75
End: 2020-11-13

## 2020-11-13 NOTE — TELEPHONE ENCOUNTER
Patient did  some [employer] nasal spray    He wants to confirm his directions,   Did he say 2 puffs  in each nostril once  Day? Or one in each nostril twice a day ? please advise

## 2021-06-23 ENCOUNTER — OFFICE VISIT (OUTPATIENT)
Dept: ENT CLINIC | Age: 76
End: 2021-06-23
Payer: MEDICARE

## 2021-06-23 VITALS — TEMPERATURE: 97.1 F | DIASTOLIC BLOOD PRESSURE: 92 MMHG | SYSTOLIC BLOOD PRESSURE: 175 MMHG | HEART RATE: 62 BPM

## 2021-06-23 DIAGNOSIS — H61.21 IMPACTED CERUMEN OF RIGHT EAR: Primary | ICD-10-CM

## 2021-06-23 PROCEDURE — G8420 CALC BMI NORM PARAMETERS: HCPCS | Performed by: OTOLARYNGOLOGY

## 2021-06-23 PROCEDURE — 3017F COLORECTAL CA SCREEN DOC REV: CPT | Performed by: OTOLARYNGOLOGY

## 2021-06-23 PROCEDURE — 1036F TOBACCO NON-USER: CPT | Performed by: OTOLARYNGOLOGY

## 2021-06-23 PROCEDURE — 4040F PNEUMOC VAC/ADMIN/RCVD: CPT | Performed by: OTOLARYNGOLOGY

## 2021-06-23 PROCEDURE — 1123F ACP DISCUSS/DSCN MKR DOCD: CPT | Performed by: OTOLARYNGOLOGY

## 2021-06-23 PROCEDURE — 99213 OFFICE O/P EST LOW 20 MIN: CPT | Performed by: OTOLARYNGOLOGY

## 2021-06-23 PROCEDURE — G8427 DOCREV CUR MEDS BY ELIG CLIN: HCPCS | Performed by: OTOLARYNGOLOGY

## 2021-06-23 NOTE — PROGRESS NOTES
Patient has had some irritation in the right ear more recently. He has had no fever and no change in hearing. He has had no problems breathing and has had no vertigo. No fevers been noted. He continues to be a non-smoker. Currently, he has no obvious acute distress. Ear examination on the right reveals some mild cerumen occlusion which is removed with a curette. Ear now is normal with a normal-appearing tympanic membrane. The opposite ear is entirely normal.  Oral examination is unremarkable. The nasal mucosa is mildly edematous consistent with some allergy but there is no evidence of purulent drainage nor obstruction. The neck remains free of any adenopathy, mass, thyroid enlargement. He will return as needed. No further treatment is necessary at this time.

## 2021-10-22 NOTE — PROGRESS NOTES
[address]    H+P // Shivam Holbrook // OUTPATIENT VISIT // Tasneem Greene MD   ENC TYPE Followup     CHIEF COMPLAINT     TYPE Chronic   SX None   PROBS HTN, CHOL     HISTORY OF PRESENT ILLNESS     GEN Doing well. No new concerns. Wife  breast cancer several months ago. HTN Ambulatory BP in good range, no ha or dizziness. CHOL Last chol in good range. Declines statin   MED Compliant with CV meds listed below without reported side effects. HISTORY/ALLERGIES/ROS     MedHx:  has a past medical history of Chronic kidney disease, Dizziness, nonspecific, Gall stones, Heart murmur, Hyperlipidemia, Hypertension, Prostate cancer ([facility] [address] and Unspecified sleep apnea. SurgHx:  has a past surgical history that includes Prostatectomy; Neck surgery; Tonsillectomy and adenoidectomy; Colonoscopy; Vasectomy; Upper gastrointestinal endoscopy; and penile prosthesis (13). SocHx:  reports that he has never smoked. He has never used smokeless tobacco. He reports that he does not drink alcohol and does not use drugs. FamHx: family history includes Heart Disease in his father and paternal grandmother. Allerg: Simvastatin and Pristiq [desvenlafaxine]   ROS: [x]Full ROS obtained and negative except as mentioned in HPI     MEDICATIONS      Current Outpatient Medications   Medication Sig Dispense Refill    montelukast (SINGULAIR) 10 MG tablet Take 1 tablet by mouth nightly 15 tablet 1    triamcinolone (KENALOG) 0.1 % cream Apply topically  Daily. Apply with Q tip in AS nightly 15 g 1    esomeprazole (NEXIUM) 40 MG delayed release capsule       Abacavir-Dolutegravir-Lamivud (TRIUMEQ) 600- MG TABS TAKE 1 TABLET BY MOUTH  EVERY DAY      zolpidem (AMBIEN) 10 MG tablet       hydrOXYzine (ATARAX) 25 MG tablet       dicyclomine (BENTYL) 10 MG capsule TK 1 C PO BID PRN       No current facility-administered medications for this visit.      Reviewed with patient and will remain unchanged except as mentioned in A/P  PHYSICAL EXAM     Vitals:    11/04/21 1341   BP: 128/70   Pulse: 70   SpO2: 98%      Gen Alert, coop, no distress Heart  Rrr, no mrg   Head NC, AT, no abnorm Abd  Soft, NT, +BS, no mass, no OM   Eyes PER, conj/corn clear Ext  Ext nl, AT, no C/C/E   Nose Nares nl, no drain, NT Pulse 2+ and symmetric   Throat Lips, mucosa, tongue nl Skin Col/text/turg nl, no vis rash/les   Neck S/S, TM, NT, no bruit/JVD Psych Nl mood and affect   Lung CTA-B, unlabored, no DTP Lymph   No cervical or axillary LA   Ch wall NT, no deform Neuro  Nl gross M/S exam     ASSESSMENT AND PLAN     *HTN  Status Controlled  TTE 5/18: 65%  Plan Counseled on diet/salt/exercise/weight, continue meds at doses above  *CHOL  Status  Uncontrolled with last LDL of 123  Plan Counseled on diet/exercise/weight, lipid/liver surveillance per PCP  *COMPLIANCE  Status Compliant  Plan Discussed importance of compliance with meds/diet/salt/exercise; avoid tob/alc/drugs; patient verbalized understanding  *FOLLOWUP  12 months    Scribe Attestation  I, Lella Dakin, am scribing for and in the presence of Manisha Sanders MD.   Signed, Lella Dakin, RN. Provider Jeny Greer is working as a scribe for and in the presence of winnie Sanders MD). Working as a scribe, Berna Henao may have prepopulated components of this note with my historical  intellectual property under my direct supervision. Any additions to this intellectual property were performed in my presence and at my direction. Furthermore, the content and accuracy of this note have been reviewed by me Manisha Sanders MD).   11/4/2021 1:09 PM     CODING     Category Diagnosis   Stable chronic illness  (49266/24269 - 2 or more) Htn, chol   Chronic illness with: Exac, progr or SA of Tx  (79848/77108 - 1 or more)    Undiagnosed new problem with: uncertain prognosis  (92569/95834 - 1 or more)    Acute illness with systemic Sx  (30754/57159 - 1 or more) Acute, complicated injury  (20499/52058 - 1 or more)    91878 1 or more chronic illness with exacerbation, progression or SA of treatment    Time  30-39 minutes spent preparing to see patient including reviewing patient history/prior tests/prior consults, performing a medical exam, counseling and educating patient/family/caregiver, ordering medications/tests/procedures, referring and communicating with PCPs and other pertinent consultants, documenting information in the EMR, independently interpreting results and communicating to family and coordination of patient care.

## 2021-10-22 NOTE — PATIENT INSTRUCTIONS
No changes today  Everything looks good - watch diet to help with cholesterol numbers  Follow up in 1 year unless you need anything sooner

## 2021-11-04 ENCOUNTER — OFFICE VISIT (OUTPATIENT)
Dept: CARDIOLOGY CLINIC | Age: 76
End: 2021-11-04
Payer: MEDICARE

## 2021-11-04 ENCOUNTER — HOSPITAL ENCOUNTER (OUTPATIENT)
Age: 76
Discharge: HOME | End: 2021-11-04
Payer: MEDICARE

## 2021-11-04 VITALS
OXYGEN SATURATION: 98 % | DIASTOLIC BLOOD PRESSURE: 70 MMHG | BODY MASS INDEX: 19.77 KG/M2 | SYSTOLIC BLOOD PRESSURE: 128 MMHG | WEIGHT: 146 LBS | HEART RATE: 70 BPM | HEIGHT: 72 IN

## 2021-11-04 DIAGNOSIS — I10 ESSENTIAL HYPERTENSION: Primary | ICD-10-CM

## 2021-11-04 LAB
ANION GAP SERPL CALCULATED.3IONS-SCNC: 16 MMOL/L (ref 3–16)
BUN BLDV-MCNC: 20 MG/DL (ref 7–20)
CALCIUM SERPL-MCNC: 9.9 MG/DL (ref 8.3–10.6)
CHLORIDE BLD-SCNC: 104 MMOL/L (ref 99–110)
CO2: 24 MMOL/L (ref 21–32)
CREAT SERPL-MCNC: 0.9 MG/DL (ref 0.8–1.3)
GFR AFRICAN AMERICAN: >60
GFR NON-AFRICAN AMERICAN: >60
GLUCOSE BLD-MCNC: 88 MG/DL (ref 70–99)
MAGNESIUM: 2.2 MG/DL (ref 1.8–2.4)
POTASSIUM SERPL-SCNC: 5.1 MMOL/L (ref 3.5–5.1)
SODIUM BLD-SCNC: 144 MMOL/L (ref 136–145)
VITAMIN D 25-HYDROXY: 76.1 NG/ML

## 2021-11-04 PROCEDURE — G8420 CALC BMI NORM PARAMETERS: HCPCS | Performed by: INTERNAL MEDICINE

## 2021-11-04 PROCEDURE — 4040F PNEUMOC VAC/ADMIN/RCVD: CPT | Performed by: INTERNAL MEDICINE

## 2021-11-04 PROCEDURE — 3017F COLORECTAL CA SCREEN DOC REV: CPT | Performed by: INTERNAL MEDICINE

## 2021-11-04 PROCEDURE — 36415 COLL VENOUS BLD VENIPUNCTURE: CPT

## 2021-11-04 PROCEDURE — G8484 FLU IMMUNIZE NO ADMIN: HCPCS | Performed by: INTERNAL MEDICINE

## 2021-11-04 PROCEDURE — 99214 OFFICE O/P EST MOD 30 MIN: CPT | Performed by: INTERNAL MEDICINE

## 2021-11-04 PROCEDURE — 83735 ASSAY OF MAGNESIUM: CPT

## 2021-11-04 PROCEDURE — 1036F TOBACCO NON-USER: CPT | Performed by: INTERNAL MEDICINE

## 2021-11-04 PROCEDURE — 80048 BASIC METABOLIC PNL TOTAL CA: CPT

## 2021-11-04 PROCEDURE — 1123F ACP DISCUSS/DSCN MKR DOCD: CPT | Performed by: INTERNAL MEDICINE

## 2021-11-04 PROCEDURE — G8427 DOCREV CUR MEDS BY ELIG CLIN: HCPCS | Performed by: INTERNAL MEDICINE

## 2021-11-04 PROCEDURE — 82306 VITAMIN D 25 HYDROXY: CPT

## 2022-12-21 NOTE — PROGRESS NOTES
[facility]  H+P  Consult  OP Visit  FU Visit   CC HX HPI   GEN  Doing well. No new concerns. HTN  Ambulatory BP in good range. Ø HA/dizziness. MED  Compliant with CV meds. Ø reported SA. HISTORY/ALLERGY/ROS   MEDHx  has a past medical history of Chronic kidney disease, Dizziness, nonspecific, Gall stones, Heart murmur, Hyperlipidemia, Hypertension, Prostate cancer ([address] and Unspecified sleep apnea. SURGHx  has a past surgical history that includes Prostatectomy; Neck surgery; Tonsillectomy and adenoidectomy; Colonoscopy; Vasectomy; Upper gastrointestinal endoscopy; and Penile prosthesis implant (12/17/13). SOCHx  reports that he has never smoked. He has never used smokeless tobacco. He reports that he does not drink alcohol and does not use drugs. FAMHx family history includes Heart Disease in his father and paternal grandmother. ALLERG Simvastatin and Pristiq [desvenlafaxine]   ROS Full ROS obtained and negative except as mentioned in HPI   MEDICATIONS   Current Outpatient Medications   Medication Sig Dispense Refill    montelukast (SINGULAIR) 10 MG tablet Take 1 tablet by mouth nightly 15 tablet 1    triamcinolone (KENALOG) 0.1 % cream Apply topically  Daily. Apply with Q tip in AS nightly 15 g 1    esomeprazole (NEXIUM) 40 MG delayed release capsule       Abacavir-Dolutegravir-Lamivud (TRIUMEQ) 600- MG TABS TAKE 1 TABLET BY MOUTH  EVERY DAY      zolpidem (AMBIEN) 10 MG tablet       hydrOXYzine (ATARAX) 25 MG tablet       dicyclomine (BENTYL) 10 MG capsule TK 1 C PO BID PRN       No current facility-administered medications for this visit.       PHYSICAL EXAM   Vitals /80 (Site: Right Upper Arm, Position: Sitting, Cuff Size: Medium Adult)   Pulse 74   Ht 6' (1.829 m)   Wt 155 lb 6.4 oz (70.5 kg)   SpO2 97%   BMI 21.08 kg/m²     Gen Alert, coop, no distress Heart  Rrr, no mrg   Head NC, AT, no abnorm Abd  Soft, NT, +BS, no mass, no OM   Eyes PER, conj/corn clear Ext Ext nl, AT, no C/C/E   Nose Nares nl, no drain, NT Pulse 2+ and symmetric   Throat Lips, mucosa, tongue nl Skin Col/text/turg nl, no vis rash/les   Neck S/S, TM, NT, no bruit/JVD Psych Nl mood and affect   Lung CTA-B, unlabored, no DTP Lymph   No cervical or axillary LA   Ch wall NT, no deform Neuro  Nl gross M/S exam      CODING   SCI (28186) - HTN  30-39 minutes preparing to see pt including review hx, tests, consults, perf exam, , educating pt, fam, caregiver, ordering meds/tests/procedures, referring and comm with pcps and other consultants, documenting info in EMR, interpreting results and communicating to fam and coordination of pt care. SCRIBE   Nurse - Scribe Attestation  STANLEY [employer], am scribing for and in the presence of Keyona Dhaliwal MD.   Signed, [employer], RN. Doctor - Provider Shai Hoang is working as a scribe for and in the presence of winnie Dhaliwal MD). Working as a scribe, [employer] may have prepopulated components of this note with my historical  intellectual property under my direct supervision. Any additions to this intellectual property were performed in my presence and at my direction.   Furthermore, the content and accuracy of this note have been reviewed by winnie Dhaliwal MD).  12/22/2022 8:49 AM   ASSESSMENT AND PLAN   *HTN  Status Controlled   Plan Counseled on diet/salt/exercise/weight, continue meds at doses above   *COMPLIANCE  Status Compliant   Plan Discussed compliance with meds/diet/salt/exercise; avoid tob/alc/drugs   *FOLLOWUP  12 months

## 2022-12-22 ENCOUNTER — OFFICE VISIT (OUTPATIENT)
Dept: CARDIOLOGY CLINIC | Age: 77
End: 2022-12-22

## 2022-12-22 VITALS
SYSTOLIC BLOOD PRESSURE: 136 MMHG | OXYGEN SATURATION: 97 % | HEIGHT: 72 IN | DIASTOLIC BLOOD PRESSURE: 80 MMHG | BODY MASS INDEX: 21.05 KG/M2 | HEART RATE: 74 BPM | WEIGHT: 155.4 LBS

## 2022-12-22 DIAGNOSIS — I10 ESSENTIAL HYPERTENSION: Primary | ICD-10-CM

## 2022-12-22 DIAGNOSIS — E78.00 HYPERCHOLESTEROLEMIA: ICD-10-CM

## 2022-12-27 NOTE — TELEPHONE ENCOUNTER
[phone] (home)   Call to pt, pt ok to wait till tomorrow Spoke with patient.  Patient informed naltrexone has been denied because drug is not covered/plan exclusion.  Patient states she paid for prescription out of pocket.  Per patient, she will start medication once her respiratory symptoms improve.     Will close this encounter.

## 2023-06-02 ENCOUNTER — HOSPITAL ENCOUNTER (OUTPATIENT)
Age: 78
Discharge: HOME | End: 2023-06-02
Payer: MEDICARE

## 2023-06-02 LAB
25(OH)D3 SERPL-MCNC: 55.4 NG/ML
ANION GAP SERPL CALCULATED.3IONS-SCNC: 10 MMOL/L (ref 3–16)
BUN SERPL-MCNC: 15 MG/DL (ref 7–20)
CALCIUM SERPL-MCNC: 9.6 MG/DL (ref 8.3–10.6)
CHLORIDE SERPL-SCNC: 105 MMOL/L (ref 99–110)
CO2 SERPL-SCNC: 26 MMOL/L (ref 21–32)
CREAT SERPL-MCNC: 0.8 MG/DL (ref 0.8–1.3)
GFR SERPLBLD CREATININE-BSD FMLA CKD-EPI: >60 ML/MIN/{1.73_M2}
GLUCOSE SERPL-MCNC: 121 MG/DL (ref 70–99)
MAGNESIUM SERPL-MCNC: 2.8 MG/DL (ref 1.8–2.4)
POTASSIUM SERPL-SCNC: 4.8 MMOL/L (ref 3.5–5.1)
SODIUM SERPL-SCNC: 141 MMOL/L (ref 136–145)

## 2023-06-02 PROCEDURE — 82306 VITAMIN D 25 HYDROXY: CPT

## 2023-06-02 PROCEDURE — 80048 BASIC METABOLIC PNL TOTAL CA: CPT

## 2023-06-02 PROCEDURE — 83735 ASSAY OF MAGNESIUM: CPT

## 2023-06-02 PROCEDURE — 36415 COLL VENOUS BLD VENIPUNCTURE: CPT

## 2023-08-24 ENCOUNTER — HOSPITAL ENCOUNTER (OUTPATIENT)
Age: 78
Discharge: HOME | End: 2023-08-24
Payer: MEDICARE

## 2023-08-24 LAB
25(OH)D3 SERPL-MCNC: 62.9 NG/ML
ALBUMIN SERPL-MCNC: 4.4 G/DL (ref 3.4–5)
ALBUMIN/GLOB SERPL: 1.4 {RATIO} (ref 1.1–2.2)
ALP SERPL-CCNC: 77 U/L (ref 40–129)
ALT SERPL-CCNC: 23 U/L (ref 10–40)
ANION GAP SERPL CALCULATED.3IONS-SCNC: 11 MMOL/L (ref 3–16)
AST SERPL-CCNC: 20 U/L (ref 15–37)
BACTERIA URNS QL MICRO: NORMAL /HPF
BILIRUB SERPL-MCNC: 0.6 MG/DL (ref 0–1)
BILIRUB UR QL STRIP.AUTO: NEGATIVE
BUN SERPL-MCNC: 17 MG/DL (ref 7–20)
CALCIUM SERPL-MCNC: 9.8 MG/DL (ref 8.3–10.6)
CHLORIDE SERPL-SCNC: 105 MMOL/L (ref 99–110)
CHOLEST SERPL-MCNC: 214 MG/DL (ref 0–199)
CLARITY UR: CLEAR
CO2 SERPL-SCNC: 28 MMOL/L (ref 21–32)
COLOR UR: YELLOW
CREAT SERPL-MCNC: 0.9 MG/DL (ref 0.8–1.3)
DEPRECATED RDW RBC AUTO: 13.9 % (ref 12.4–15.4)
EPI CELLS #/AREA URNS AUTO: 0 /HPF (ref 0–5)
EST. AVERAGE GLUCOSE BLD GHB EST-MCNC: 96.8 MG/DL
GFR SERPLBLD CREATININE-BSD FMLA CKD-EPI: >60 ML/MIN/{1.73_M2}
GLUCOSE SERPL-MCNC: 91 MG/DL (ref 70–99)
GLUCOSE UR STRIP.AUTO-MCNC: NEGATIVE MG/DL
HBA1C MFR BLD: 5 %
HCT VFR BLD AUTO: 48.5 % (ref 40.5–52.5)
HDLC SERPL-MCNC: 33 MG/DL (ref 40–60)
HGB BLD-MCNC: 17 G/DL (ref 13.5–17.5)
HGB UR QL STRIP.AUTO: NEGATIVE
HYALINE CASTS #/AREA URNS AUTO: 1 /LPF (ref 0–8)
KETONES UR STRIP.AUTO-MCNC: ABNORMAL MG/DL
LDLC SERPL CALC-MCNC: 152 MG/DL
LEUKOCYTE ESTERASE UR QL STRIP.AUTO: NEGATIVE
MCH RBC QN AUTO: 34.5 PG (ref 26–34)
MCHC RBC AUTO-ENTMCNC: 35 G/DL (ref 31–36)
MCV RBC AUTO: 98.4 FL (ref 80–100)
NITRITE UR QL STRIP.AUTO: NEGATIVE
PH UR STRIP.AUTO: 6 [PH] (ref 5–8)
PLATELET # BLD AUTO: 247 K/UL (ref 135–450)
PMV BLD AUTO: 8.9 FL (ref 5–10.5)
POTASSIUM SERPL-SCNC: 4.9 MMOL/L (ref 3.5–5.1)
PROT SERPL-MCNC: 7.5 G/DL (ref 6.4–8.2)
PROT UR STRIP.AUTO-MCNC: 30 MG/DL
PSA SERPL DL<=0.01 NG/ML-MCNC: <0.01 NG/ML (ref 0–4)
RBC # BLD AUTO: 4.93 M/UL (ref 4.2–5.9)
RBC CLUMPS #/AREA URNS AUTO: 2 /HPF (ref 0–4)
SODIUM SERPL-SCNC: 144 MMOL/L (ref 136–145)
SP GR UR STRIP.AUTO: 1.02 (ref 1–1.03)
TRIGL SERPL-MCNC: 145 MG/DL (ref 0–150)
UA DIPSTICK W REFLEX MICRO PNL UR: YES
URN SPEC COLLECT METH UR: ABNORMAL
UROBILINOGEN UR STRIP-ACNC: 1 E.U./DL
VLDLC SERPL CALC-MCNC: 29 MG/DL
WBC # BLD AUTO: 5 K/UL (ref 4–11)
WBC #/AREA URNS AUTO: 1 /HPF (ref 0–5)

## 2023-08-24 PROCEDURE — 82306 VITAMIN D 25 HYDROXY: CPT

## 2023-08-24 PROCEDURE — 85027 COMPLETE CBC AUTOMATED: CPT

## 2023-08-24 PROCEDURE — 80061 LIPID PANEL: CPT

## 2023-08-24 PROCEDURE — 81001 URINALYSIS AUTO W/SCOPE: CPT

## 2023-08-24 PROCEDURE — 80053 COMPREHEN METABOLIC PANEL: CPT

## 2023-08-24 PROCEDURE — 83036 HEMOGLOBIN GLYCOSYLATED A1C: CPT

## 2023-08-24 PROCEDURE — 84153 ASSAY OF PSA TOTAL: CPT

## 2023-09-14 ENCOUNTER — PROCEDURE VISIT (OUTPATIENT)
Dept: NEUROLOGY | Age: 78
End: 2023-09-14
Payer: MEDICARE

## 2023-09-14 DIAGNOSIS — R20.0 BILATERAL LEG NUMBNESS: Primary | ICD-10-CM

## 2023-09-14 PROCEDURE — 95886 MUSC TEST DONE W/N TEST COMP: CPT | Performed by: PSYCHIATRY & NEUROLOGY

## 2023-09-14 PROCEDURE — 95909 NRV CNDJ TST 5-6 STUDIES: CPT | Performed by: PSYCHIATRY & NEUROLOGY

## 2023-09-14 NOTE — PROGRESS NOTES
Yesy Tinoco M.D. [facility]) Physicians/[facility] [place] [facility]  Board Certified in [address], [address] [facility], [address] [facility]    EMG / NERVE CONDUCTION STUDY      PATIENT:  Abbie Rodriguez       DATE OF EM23     YOB: 1945       REASON FOR EMG:   Bilateral leg numbness      REFERRING PHYSICIAN:  Sonia Silva DPM  [address] [facility],  [address] [facility]     SUMMARY:   Bilateral peroneal motor nerve studies with slow conduction velocities. The left peroneal motor nerve study also had a low amplitude. Bilateral posterior tibial motor nerve studies with slow conduction velocities. The left sural sensory nerve study and the right superficial peroneal sensory nerve study were not recordable. Needle EMG of several muscles in both lower extremities was normal.        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 ---------------------------------------------  Yesy Tinoco M.D.   Electromyographer / Neurologist

## 2024-05-19 ENCOUNTER — OFFICE VISIT (OUTPATIENT)
Age: 79
End: 2024-05-19

## 2024-05-19 VITALS
HEART RATE: 60 BPM | SYSTOLIC BLOOD PRESSURE: 148 MMHG | OXYGEN SATURATION: 95 % | BODY MASS INDEX: 20.72 KG/M2 | TEMPERATURE: 97.7 F | HEIGHT: 72 IN | DIASTOLIC BLOOD PRESSURE: 88 MMHG | WEIGHT: 153 LBS

## 2024-05-19 DIAGNOSIS — M79.642 PAIN IN BOTH HANDS: Primary | ICD-10-CM

## 2024-05-19 DIAGNOSIS — M79.641 PAIN IN BOTH HANDS: Primary | ICD-10-CM

## 2024-05-28 ENCOUNTER — HOSPITAL ENCOUNTER (OUTPATIENT)
Age: 79
Discharge: HOME | End: 2024-05-28
Payer: MEDICARE

## 2024-05-28 ENCOUNTER — HOSPITAL ENCOUNTER (OUTPATIENT)
Dept: GENERAL RADIOLOGY | Age: 79
Discharge: HOME | End: 2024-05-28
Payer: MEDICARE

## 2024-05-28 ENCOUNTER — OFFICE VISIT (OUTPATIENT)
Age: 79
End: 2024-05-28

## 2024-05-28 VITALS
WEIGHT: 153 LBS | HEIGHT: 72 IN | RESPIRATION RATE: 17 BRPM | HEART RATE: 57 BPM | TEMPERATURE: 98.3 F | DIASTOLIC BLOOD PRESSURE: 91 MMHG | BODY MASS INDEX: 20.72 KG/M2 | OXYGEN SATURATION: 95 % | SYSTOLIC BLOOD PRESSURE: 151 MMHG

## 2024-05-28 DIAGNOSIS — S16.1XXA STRAIN OF NECK MUSCLE, INITIAL ENCOUNTER: Primary | ICD-10-CM

## 2024-05-28 DIAGNOSIS — S16.1XXA STRAIN OF NECK MUSCLE, INITIAL ENCOUNTER: ICD-10-CM

## 2024-05-28 PROCEDURE — 72040 X-RAY EXAM NECK SPINE 2-3 VW: CPT

## 2024-05-28 RX ORDER — NAPROXEN 500 MG/1
500 TABLET ORAL 2 TIMES DAILY WITH MEALS
Qty: 14 TABLET | Refills: 0 | Status: SHIPPED | OUTPATIENT
Start: 2024-05-28 | End: 2024-06-04

## 2024-05-28 NOTE — PROGRESS NOTES
Arvind Buchanan (:  1945) is a 78 y.o. male,Established patient, here for evaluation of the following chief complaint(s):  Neck Pain (Fell on this past Saturday , was in darkness and fell to the right side, and with neck pain and bothering him off and on )      ASSESSMENT/PLAN:  1. Strain of neck muscle, initial encounter  - CHG RADEX SPINE CERVICAL 2 OR 3 VIEWS  - naproxen (NAPROSYN) 500 MG tablet; Take 1 tablet by mouth 2 times daily (with meals) for 7 days  Dispense: 14 tablet; Refill: 0       Return if symptoms worsen or fail to improve.    SUBJECTIVE/OBJECTIVE:  PRESENT TO CLINIC WITH NECK DISCOMFORT HAPPENED AFTER FALL FOUR DAYS AGO. NO LOSS OF CONSCIOUSNESS.       History provided by:  Patient  Neck Pain         Vitals:    24 0921   BP: (!) 151/91   Site: Left Upper Arm   Position: Sitting   Cuff Size: Medium Adult   Pulse: 57   Resp: 17   Temp: 98.3 °F (36.8 °C)   SpO2: 95%   Weight: 69.4 kg (153 lb)   Height: 1.829 m (6')       Review of Systems   Musculoskeletal:  Positive for neck pain.       Physical Exam  Constitutional:       Appearance: Normal appearance.   HENT:      Head: Normocephalic and atraumatic.      Nose: Nose normal.      Mouth/Throat:      Mouth: Mucous membranes are moist.   Eyes:      Pupils: Pupils are equal, round, and reactive to light.   Neck:      Comments: LIMITED RANGE OF MOTION  Pulmonary:      Effort: Pulmonary effort is normal.      Breath sounds: Normal breath sounds.   Musculoskeletal:         General: Normal range of motion.      Cervical back: Tenderness present. No rigidity.   Neurological:      Mental Status: He is alert and oriented to person, place, and time.   Psychiatric:         Mood and Affect: Mood normal.           An electronic signature was used to authenticate this note.    --Vidhya Aguilera DO

## 2024-06-06 ENCOUNTER — PROCEDURE VISIT (OUTPATIENT)
Dept: NEUROLOGY | Age: 79
End: 2024-06-06

## 2024-06-06 DIAGNOSIS — R20.0 NUMBNESS AND TINGLING OF RIGHT HAND: Primary | ICD-10-CM

## 2024-06-06 DIAGNOSIS — R20.2 NUMBNESS AND TINGLING OF RIGHT HAND: Primary | ICD-10-CM

## 2024-06-06 NOTE — PROGRESS NOTES
Dear DR. Carl MD    I had the pleasure of seeing your patient Arvind Buchanan  today for EMG and NCV. I have attached a detailed summary below:        Electromyography report        [facility]  [address] [place] [address], [address]  [place] [address]      Patient: Arvind Buchanan    MR Number: [id number]  YOB: 1945  Date of Visit: 6/6/2024    Clinical history:  The patient is a 78 y.o. years old male    Findings:   For full details about such findings, please see attached report. Needle examination was recorded using monopolar needle in selected muscles. Unless otherwise noted, the temperature of the limb was monitored and maintained between 32-36°C during the performance of the NCV testing.     1.  Right median motor distal latency, amplitude and conduction velocities were within normal limits.  2.  Right ulnar motor distal latency, amplitudes and conduction velocities were within normal limits.  3.  Right median sensory distal latency and amplitude were within normal limits.  4.  Right ulnar sensory amplitude this decreased compared to left sensory on the response.  5.  Left ulnar motor response was normal.  Right SRAVANI was normal.  6.  Right ulnar sensory response was normal  7. Right radial sensory distal latency and amplitude were within normal limits.  8.  Needle examinations of the right upper extremity was performed in selected muscles. Needle examination of these selected muscle showed normal insertional activities, morphology, amplitude, and recruitment of motor unit potential.      Impression:    This test is mildly abnormal. 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  No evidence of conduction block or conduction slowing across the elbow.  No evidence of polyneuropathy, plexopathy, or radiculopathy.      Grisel Estrella MD    Diplomate of the American board of electrodiagnostics.

## 2024-06-06 NOTE — PATIENT INSTRUCTIONS
Verbal consent was obtained from patient and/or patient's advocate for in office procedure with Dr. Grisel Romero MD (EMG or EEG).

## 2024-07-08 ENCOUNTER — TELEPHONE (OUTPATIENT)
Dept: CARDIOLOGY CLINIC | Age: 79
End: 2024-07-08

## 2024-07-08 NOTE — TELEPHONE ENCOUNTER
CARDIAC CLEARANCE     What type of procedure are you having?  Nerve is out of place in forearm and he is having it repaired / general anesthesia  Which physician is performing your procedure?  Dr Henry  When is your procedure scheduled for?  7/17  Where are you having this procedure?  [facility]  Are you taking Blood Thinners? no   If so what? (Name/dose/frequesncy)     Does the surgeon want you to stop your blood thinner?  If so for how long?    Phone Number and Contact Name for Physicians office:  [phone]  Fax number to send information:  Does not have

## 2024-07-08 NOTE — TELEPHONE ENCOUNTER
Shannon from Dr. Hallie Henry office called re: the Pt clearance.  The fax is:    [phone].  Thank you

## 2024-07-10 NOTE — TELEPHONE ENCOUNTER
Spoke to Rosa from Dr. Henry's office about message below. She verbalized understanding and stated she will get in contact with PCP.

## 2024-07-10 NOTE — TELEPHONE ENCOUNTER
Patient has not been seen since 12/22. Only seen for hypertension which can be managed by PCP. Patient should not need cardiac clearance. Please let office know

## 2024-07-10 NOTE — TELEPHONE ENCOUNTER
I spoke with pt and let him know. I also called Dr Henry office and left a VM.     I let pt know that Dr Henry's office will contact PCP

## 2024-07-11 RX ORDER — ABACAVIR SULFATE, DOLUTEGRAVIR SODIUM, LAMIVUDINE 600; 50; 300 MG/1; MG/1; MG/1
TABLET, FILM COATED ORAL
COMMUNITY

## 2024-07-11 NOTE — PROGRESS NOTES
Name_______________________________________Printed:____________________  Date and time of surgery___7/17/2024__________0730___________Arrival Time:_0600____/per office____   1. The instructions given regarding when and if a patient needs to stop oral intake prior to surgery varies.Follow the specific instructions you were given                  __x_Nothing to eat or to drink after Midnight the night before.                   ____Carbo loading or instructions will be given to select patients-if you have been given those instructions -please do the following                           The evening before your surgery after dinner before midnight drink 40 ounces of gatorade.If you are diabetic use sugar free.  The morning of surgery drink 40 ounces of water.This needs to be finished 3 hours prior to your surgery start time.    2. Take the following pills with a small sip of water on the morning of surgery___nexium________________________________________________                  Do not take blood pressure medications ending in pril or sartan the [place] prior to surgery or the morning of surgery. Dr Arita's patient are not to take any medications the AM of surgery.         3. Aspirin, Ibuprofen, Advil, Naproxen, Vitamin E and other Anti-inflammatory products and supplements should be stopped for 5 -7days before surgery or as directed by your physician.   4. Check with your Doctor regarding stopping Plavix, Coumadin,Eliquis, Lovenox,Effient,Pradaxa,Xarelto, Fragmin or other blood thinners and follow their instructions.   5. Do not smoke, and do not drink any alcoholic beverages 24 hours prior to surgery.  This includes NA Beer.Refrain from the usage of any recreational drugs.   6. You may brush your teeth and gargle the morning of surgery.  DO NOT SWALLOW WATER   7. You MUST make arrangements for a responsible adult to stay on site while you are here and take you home after your surgery. You will not be allowed to leave alone

## 2024-07-15 ENCOUNTER — ANESTHESIA EVENT (OUTPATIENT)
Dept: OPERATING ROOM | Age: 79
End: 2024-07-15
Payer: MEDICARE

## 2024-07-15 NOTE — H&P
[place] [id number]                            PREOPERATIVE H&P      PATIENT NAME: JENNIFER ACEVEDO               : 1945  MED REC NO: [id number]                      ROOM:   ACCOUNT NO: [id number]                       ADMIT DATE: 2024  PROVIDER: Hallie Henry MD      DATE OF SURGERY:  2024.    DIAGNOSIS:  Right cubital tunnel syndrome.    PLANNED PROCEDURE:  Right ulnar nerve transposition.    INDICATION:  78-year-old white male who initially presented with history of right ulnar hand pain as well as occasional paresthesias on the ulnar hypothenar region.  No history of trauma.  We initially placed him on conservative management without improved symptomatology and actually he felt that there was increasing weakness with an attempt at conservative management.  An EMG nerve conduction study was subsequently obtained, which showed evidence of mild ulnar nerve lesion at the elbow consistent with cubital tunnel syndrome.  These findings were discussed at length with the patient and initially he felt that the symptoms were not significant enough and wished to go ahead and proceed with continued conservative management.  Over the next several weeks, the patient returned signifying increasing pain and discomfort and at this point after discussing further options, elected to proceed with definitive ulnar nerve transposition.  Issue of bleeding, infection, scarring, as well as the issue of persistent symptomatology were discussed and consent was obtained.    PAST MEDICAL HISTORY:  Significant for coronary artery disease as well as high blood pressure.  No history of diabetes.    ALLERGIES:  NONE.      SOCIAL HISTORY:  Nonsmoker.    PAST SURGICAL HISTORY:  No prior surgical history according to the patient.    PHYSICAL EXAMINATION:  GENERAL:  Reveals a pleasant and alert male, in no apparent distress.  VITAL SIGNS:

## 2024-07-17 ENCOUNTER — HOSPITAL ENCOUNTER (OUTPATIENT)
Age: 79
Setting detail: OUTPATIENT SURGERY
Discharge: HOME | End: 2024-07-17
Attending: PLASTIC SURGERY | Admitting: PLASTIC SURGERY
Payer: MEDICARE

## 2024-07-17 ENCOUNTER — ANESTHESIA (OUTPATIENT)
Dept: OPERATING ROOM | Age: 79
End: 2024-07-17
Payer: MEDICARE

## 2024-07-17 VITALS
HEART RATE: 49 BPM | BODY MASS INDEX: 20.32 KG/M2 | RESPIRATION RATE: 16 BRPM | DIASTOLIC BLOOD PRESSURE: 69 MMHG | OXYGEN SATURATION: 99 % | HEIGHT: 72 IN | SYSTOLIC BLOOD PRESSURE: 161 MMHG | TEMPERATURE: 96.9 F | WEIGHT: 150 LBS

## 2024-07-17 DIAGNOSIS — G56.21 LESION OF RIGHT ULNAR NERVE: Primary | ICD-10-CM

## 2024-07-17 PROCEDURE — 7100000000 HC PACU RECOVERY - FIRST 15 MIN: Performed by: PLASTIC SURGERY

## 2024-07-17 PROCEDURE — 2500000003 HC RX 250 WO HCPCS: Performed by: NURSE ANESTHETIST, CERTIFIED REGISTERED

## 2024-07-17 PROCEDURE — 7100000011 HC PHASE II RECOVERY - ADDTL 15 MIN: Performed by: PLASTIC SURGERY

## 2024-07-17 PROCEDURE — 7100000001 HC PACU RECOVERY - ADDTL 15 MIN: Performed by: PLASTIC SURGERY

## 2024-07-17 PROCEDURE — 2580000003 HC RX 258: Performed by: PLASTIC SURGERY

## 2024-07-17 PROCEDURE — 3600000013 HC SURGERY LEVEL 3 ADDTL 15MIN: Performed by: PLASTIC SURGERY

## 2024-07-17 PROCEDURE — 6360000002 HC RX W HCPCS: Performed by: NURSE ANESTHETIST, CERTIFIED REGISTERED

## 2024-07-17 PROCEDURE — 3700000001 HC ADD 15 MINUTES (ANESTHESIA): Performed by: PLASTIC SURGERY

## 2024-07-17 PROCEDURE — 6360000002 HC RX W HCPCS: Performed by: PLASTIC SURGERY

## 2024-07-17 PROCEDURE — 7100000010 HC PHASE II RECOVERY - FIRST 15 MIN: Performed by: PLASTIC SURGERY

## 2024-07-17 PROCEDURE — 2709999900 HC NON-CHARGEABLE SUPPLY: Performed by: PLASTIC SURGERY

## 2024-07-17 PROCEDURE — 6370000000 HC RX 637 (ALT 250 FOR IP): Performed by: STUDENT IN AN ORGANIZED HEALTH CARE EDUCATION/TRAINING PROGRAM

## 2024-07-17 PROCEDURE — 3600000003 HC SURGERY LEVEL 3 BASE: Performed by: PLASTIC SURGERY

## 2024-07-17 PROCEDURE — 3700000000 HC ANESTHESIA ATTENDED CARE: Performed by: PLASTIC SURGERY

## 2024-07-17 RX ORDER — LIDOCAINE HYDROCHLORIDE 20 MG/ML
INJECTION, SOLUTION INFILTRATION; PERINEURAL PRN
Status: DISCONTINUED | OUTPATIENT
Start: 2024-07-17 | End: 2024-07-17 | Stop reason: SDUPTHER

## 2024-07-17 RX ORDER — SODIUM CHLORIDE, SODIUM LACTATE, POTASSIUM CHLORIDE, CALCIUM CHLORIDE 600; 310; 30; 20 MG/100ML; MG/100ML; MG/100ML; MG/100ML
INJECTION, SOLUTION INTRAVENOUS CONTINUOUS
Status: DISCONTINUED | OUTPATIENT
Start: 2024-07-17 | End: 2024-07-17 | Stop reason: HOSPADM

## 2024-07-17 RX ORDER — ASCORBIC ACID 500 MG
1000 TABLET ORAL DAILY
COMMUNITY

## 2024-07-17 RX ORDER — DIPHENHYDRAMINE HYDROCHLORIDE 25 MG/1
TABLET ORAL
COMMUNITY

## 2024-07-17 RX ORDER — CYANOCOBALAMIN (VITAMIN B-12) 1000 MCG
TABLET ORAL
COMMUNITY

## 2024-07-17 RX ORDER — PROPOFOL 10 MG/ML
INJECTION, EMULSION INTRAVENOUS PRN
Status: DISCONTINUED | OUTPATIENT
Start: 2024-07-17 | End: 2024-07-17 | Stop reason: SDUPTHER

## 2024-07-17 RX ORDER — DEXMEDETOMIDINE HYDROCHLORIDE 100 UG/ML
INJECTION, SOLUTION INTRAVENOUS PRN
Status: DISCONTINUED | OUTPATIENT
Start: 2024-07-17 | End: 2024-07-17 | Stop reason: SDUPTHER

## 2024-07-17 RX ORDER — OXYCODONE HYDROCHLORIDE 5 MG/1
5 TABLET ORAL PRN
Status: COMPLETED | OUTPATIENT
Start: 2024-07-17 | End: 2024-07-17

## 2024-07-17 RX ORDER — SODIUM CHLORIDE 0.9 % (FLUSH) 0.9 %
5-40 SYRINGE (ML) INJECTION EVERY 12 HOURS SCHEDULED
Status: DISCONTINUED | OUTPATIENT
Start: 2024-07-17 | End: 2024-07-17 | Stop reason: HOSPADM

## 2024-07-17 RX ORDER — LABETALOL HYDROCHLORIDE 5 MG/ML
10 INJECTION, SOLUTION INTRAVENOUS
Status: DISCONTINUED | OUTPATIENT
Start: 2024-07-17 | End: 2024-07-17 | Stop reason: HOSPADM

## 2024-07-17 RX ORDER — NALOXONE HYDROCHLORIDE 0.4 MG/ML
INJECTION, SOLUTION INTRAMUSCULAR; INTRAVENOUS; SUBCUTANEOUS PRN
Status: DISCONTINUED | OUTPATIENT
Start: 2024-07-17 | End: 2024-07-17 | Stop reason: HOSPADM

## 2024-07-17 RX ORDER — OXYCODONE HYDROCHLORIDE 5 MG/1
10 TABLET ORAL PRN
Status: COMPLETED | OUTPATIENT
Start: 2024-07-17 | End: 2024-07-17

## 2024-07-17 RX ORDER — HYDRALAZINE HYDROCHLORIDE 20 MG/ML
10 INJECTION INTRAMUSCULAR; INTRAVENOUS
Status: DISCONTINUED | OUTPATIENT
Start: 2024-07-17 | End: 2024-07-17 | Stop reason: HOSPADM

## 2024-07-17 RX ORDER — LIDOCAINE HYDROCHLORIDE 10 MG/ML
0.5 INJECTION, SOLUTION EPIDURAL; INFILTRATION; INTRACAUDAL; PERINEURAL ONCE
Status: DISCONTINUED | OUTPATIENT
Start: 2024-07-17 | End: 2024-07-17 | Stop reason: HOSPADM

## 2024-07-17 RX ORDER — CETIRIZINE HYDROCHLORIDE 10 MG/1
1 TABLET ORAL DAILY
COMMUNITY
Start: 2022-03-01

## 2024-07-17 RX ORDER — OXYCODONE AND ACETAMINOPHEN 7.5; 325 MG/1; MG/1
1 TABLET ORAL EVERY 4 HOURS PRN
Qty: 30 TABLET | Refills: 0 | Status: SHIPPED | OUTPATIENT
Start: 2024-07-17 | End: 2024-07-24

## 2024-07-17 RX ORDER — ACETAMINOPHEN 500 MG
1000 TABLET ORAL ONCE
Status: COMPLETED | OUTPATIENT
Start: 2024-07-17 | End: 2024-07-17

## 2024-07-17 RX ORDER — FAMOTIDINE 10 MG
10 TABLET ORAL 2 TIMES DAILY
COMMUNITY

## 2024-07-17 RX ORDER — SODIUM CHLORIDE 9 MG/ML
INJECTION, SOLUTION INTRAVENOUS PRN
Status: DISCONTINUED | OUTPATIENT
Start: 2024-07-17 | End: 2024-07-17 | Stop reason: HOSPADM

## 2024-07-17 RX ORDER — SODIUM CHLORIDE 0.9 % (FLUSH) 0.9 %
5-40 SYRINGE (ML) INJECTION PRN
Status: DISCONTINUED | OUTPATIENT
Start: 2024-07-17 | End: 2024-07-17 | Stop reason: HOSPADM

## 2024-07-17 RX ORDER — GLYCOPYRROLATE 0.2 MG/ML
INJECTION INTRAMUSCULAR; INTRAVENOUS PRN
Status: DISCONTINUED | OUTPATIENT
Start: 2024-07-17 | End: 2024-07-17 | Stop reason: SDUPTHER

## 2024-07-17 RX ORDER — FENTANYL CITRATE 50 UG/ML
INJECTION, SOLUTION INTRAMUSCULAR; INTRAVENOUS PRN
Status: DISCONTINUED | OUTPATIENT
Start: 2024-07-17 | End: 2024-07-17 | Stop reason: SDUPTHER

## 2024-07-17 RX ORDER — IPRATROPIUM BROMIDE AND ALBUTEROL SULFATE 2.5; .5 MG/3ML; MG/3ML
1 SOLUTION RESPIRATORY (INHALATION)
Status: DISCONTINUED | OUTPATIENT
Start: 2024-07-17 | End: 2024-07-17 | Stop reason: HOSPADM

## 2024-07-17 RX ORDER — HALOPERIDOL 5 MG/ML
1 INJECTION INTRAMUSCULAR
Status: DISCONTINUED | OUTPATIENT
Start: 2024-07-17 | End: 2024-07-17 | Stop reason: HOSPADM

## 2024-07-17 RX ORDER — BUPIVACAINE HYDROCHLORIDE 5 MG/ML
INJECTION, SOLUTION EPIDURAL; INTRACAUDAL
Status: COMPLETED | OUTPATIENT
Start: 2024-07-17 | End: 2024-07-17

## 2024-07-17 RX ORDER — HYDROMORPHONE HYDROCHLORIDE 2 MG/ML
0.25 INJECTION, SOLUTION INTRAMUSCULAR; INTRAVENOUS; SUBCUTANEOUS EVERY 5 MIN PRN
Status: DISCONTINUED | OUTPATIENT
Start: 2024-07-17 | End: 2024-07-17 | Stop reason: HOSPADM

## 2024-07-17 RX ORDER — FAMOTIDINE 10 MG/ML
INJECTION, SOLUTION INTRAVENOUS PRN
Status: DISCONTINUED | OUTPATIENT
Start: 2024-07-17 | End: 2024-07-17 | Stop reason: SDUPTHER

## 2024-07-17 RX ORDER — BACILLUS COAGULANS/INULIN 1B-250 MG
CAPSULE ORAL
COMMUNITY

## 2024-07-17 RX ORDER — ONDANSETRON 2 MG/ML
INJECTION INTRAMUSCULAR; INTRAVENOUS PRN
Status: DISCONTINUED | OUTPATIENT
Start: 2024-07-17 | End: 2024-07-17 | Stop reason: SDUPTHER

## 2024-07-17 RX ORDER — HYDROMORPHONE HYDROCHLORIDE 2 MG/ML
0.5 INJECTION, SOLUTION INTRAMUSCULAR; INTRAVENOUS; SUBCUTANEOUS EVERY 5 MIN PRN
Status: DISCONTINUED | OUTPATIENT
Start: 2024-07-17 | End: 2024-07-17 | Stop reason: HOSPADM

## 2024-07-17 RX ORDER — ONDANSETRON 2 MG/ML
4 INJECTION INTRAMUSCULAR; INTRAVENOUS
Status: DISCONTINUED | OUTPATIENT
Start: 2024-07-17 | End: 2024-07-17 | Stop reason: HOSPADM

## 2024-07-17 RX ORDER — DEXAMETHASONE SODIUM PHOSPHATE 4 MG/ML
INJECTION, SOLUTION INTRA-ARTICULAR; INTRALESIONAL; INTRAMUSCULAR; INTRAVENOUS; SOFT TISSUE PRN
Status: DISCONTINUED | OUTPATIENT
Start: 2024-07-17 | End: 2024-07-17 | Stop reason: SDUPTHER

## 2024-07-17 RX ORDER — MAGNESIUM SULFATE HEPTAHYDRATE 500 MG/ML
INJECTION, SOLUTION INTRAMUSCULAR; INTRAVENOUS PRN
Status: DISCONTINUED | OUTPATIENT
Start: 2024-07-17 | End: 2024-07-17 | Stop reason: SDUPTHER

## 2024-07-17 RX ORDER — DIPHENHYDRAMINE HYDROCHLORIDE 50 MG/ML
12.5 INJECTION INTRAMUSCULAR; INTRAVENOUS
Status: DISCONTINUED | OUTPATIENT
Start: 2024-07-17 | End: 2024-07-17 | Stop reason: HOSPADM

## 2024-07-17 RX ADMIN — FENTANYL CITRATE 25 MCG: 50 INJECTION, SOLUTION INTRAMUSCULAR; INTRAVENOUS at 08:25

## 2024-07-17 RX ADMIN — MAGNESIUM SULFATE HEPTAHYDRATE 1 G: 500 INJECTION, SOLUTION INTRAMUSCULAR; INTRAVENOUS at 07:45

## 2024-07-17 RX ADMIN — DEXAMETHASONE SODIUM PHOSPHATE 10 MG: 4 INJECTION, SOLUTION INTRAMUSCULAR; INTRAVENOUS at 07:41

## 2024-07-17 RX ADMIN — LIDOCAINE HYDROCHLORIDE 50 MG: 20 INJECTION, SOLUTION INFILTRATION; PERINEURAL at 07:33

## 2024-07-17 RX ADMIN — ONDANSETRON 4 MG: 2 INJECTION INTRAMUSCULAR; INTRAVENOUS at 08:06

## 2024-07-17 RX ADMIN — FAMOTIDINE 20 MG: 10 INJECTION INTRAVENOUS at 07:17

## 2024-07-17 RX ADMIN — SODIUM CHLORIDE, POTASSIUM CHLORIDE, SODIUM LACTATE AND CALCIUM CHLORIDE: 600; 310; 30; 20 INJECTION, SOLUTION INTRAVENOUS at 06:34

## 2024-07-17 RX ADMIN — PROPOFOL 30 MG: 10 INJECTION, EMULSION INTRAVENOUS at 07:34

## 2024-07-17 RX ADMIN — OXYCODONE 5 MG: 5 TABLET ORAL at 09:59

## 2024-07-17 RX ADMIN — CEFAZOLIN 2000 MG: 2 INJECTION, POWDER, FOR SOLUTION INTRAMUSCULAR; INTRAVENOUS at 07:35

## 2024-07-17 RX ADMIN — FENTANYL CITRATE 25 MCG: 50 INJECTION, SOLUTION INTRAMUSCULAR; INTRAVENOUS at 08:10

## 2024-07-17 RX ADMIN — FENTANYL CITRATE 50 MCG: 50 INJECTION, SOLUTION INTRAMUSCULAR; INTRAVENOUS at 07:33

## 2024-07-17 RX ADMIN — DEXMEDETOMIDINE HYDROCHLORIDE 10 MCG: 100 INJECTION, SOLUTION INTRAVENOUS at 07:39

## 2024-07-17 RX ADMIN — PROPOFOL 120 MG: 10 INJECTION, EMULSION INTRAVENOUS at 07:33

## 2024-07-17 RX ADMIN — ACETAMINOPHEN 1000 MG: 500 TABLET ORAL at 06:27

## 2024-07-17 RX ADMIN — GLYCOPYRROLATE 0.1 MG: 0.2 INJECTION INTRAMUSCULAR; INTRAVENOUS at 07:30

## 2024-07-17 RX ADMIN — DEXMEDETOMIDINE HYDROCHLORIDE 10 MCG: 100 INJECTION, SOLUTION INTRAVENOUS at 08:05

## 2024-07-17 ASSESSMENT — PAIN DESCRIPTION - ORIENTATION: ORIENTATION: RIGHT

## 2024-07-17 ASSESSMENT — PAIN DESCRIPTION - LOCATION: LOCATION: ELBOW

## 2024-07-17 ASSESSMENT — PAIN - FUNCTIONAL ASSESSMENT: PAIN_FUNCTIONAL_ASSESSMENT: NONE - DENIES PAIN

## 2024-07-17 ASSESSMENT — PAIN DESCRIPTION - DESCRIPTORS: DESCRIPTORS: ACHING

## 2024-07-17 ASSESSMENT — ENCOUNTER SYMPTOMS: SHORTNESS OF BREATH: 1

## 2024-07-17 ASSESSMENT — PAIN SCALES - GENERAL: PAINLEVEL_OUTOF10: 5

## 2024-07-17 ASSESSMENT — PAIN DESCRIPTION - PAIN TYPE: TYPE: SURGICAL PAIN

## 2024-07-17 NOTE — OP NOTE
[address] [address]                            OPERATIVE REPORT      PATIENT NAME: JENNIFER ACEVEDO               : 1945  MED REC NO: [id number]                      ROOM: ASC OR  ACCOUNT NO: [id number]                       ADMIT DATE: 2024  PROVIDER: Hallie Henry MD      DATE OF PROCEDURE:  2024    SURGEON:  Hallie Henry MD    PREOPERATIVE DIAGNOSIS:  Right cubital tunnel syndrome.    POSTOPERATIVE DIAGNOSIS:  Right cubital tunnel syndrome.    PROCEDURE:  Right median nerve compression with ulnar nerve submuscular transposition.    ANESTHESIA:  General.    BLOOD LOSS:  None.    INDICATION:  A 78-year-old male who presents with progressive worsening cubital tunnel syndrome.  We initially treated this conservatively, but his symptoms continued to worsen.  Therefore, after failed conservative measures and with a positive EMG nerve conduction study, elected to proceed with definitive ulnar nerve submuscular transposition.  The issue of bleeding, infection, wound dehiscence, scarring, as well as the issue of recurrence and persistent symptomatology was discussed, and consent was obtained.    DESCRIPTION OF PROCEDURE:  The patient was taken to the operating room on , placed in supine position, at which time general anesthesia was obtained.  The arm was then sterilely prepped and draped in the usual fashion with ChloraPrep solution.  Hand exsanguinated and tourniquet inflated to 250 mmHg.  Incision was then made below the medial epicondyle.  Using sharp and blunt dissection, the proximal ulna was identified and taken distally to the cubital tunnel until the 1st branch was identified.  This was then completely dissected free of its bed using the vessel loop.  I then created a channel within the flexor carpi ulnaris.  The ulnar nerve was then easily placed into this bed without any tension or compression with range of

## 2024-07-17 NOTE — H&P
I have reviewed the history and physical and examined the patient and find no relevant changes.    I have reviewed with the patient and/or family the risks, benefits, and alternatives to the procedure.    Pristiq [desvenlafaxine] and Simvastatin    Last recorded vitals:  BP (!) 163/88   Pulse 65   Temp 97.9 °F (36.6 °C) (Temporal)   Resp 15   Ht 1.829 m (6')   Wt 68 kg (150 lb)   SpO2 100%   BMI 20.34 kg/m²     Past Surgical History:   Procedure Laterality Date    COLONOSCOPY      NECK SURGERY      titanium plate placed    PENILE PROSTHESIS  12/17/13    PROSTATECTOMY      TONSILLECTOMY AND ADENOIDECTOMY      UPPER GASTROINTESTINAL ENDOSCOPY      VASECTOMY         Prior to Admission medications    Medication Sig Start Date End Date Taking? Authorizing Provider   cetirizine (ZYRTEC ALLERGY) 10 MG tablet Take 1 tablet by mouth daily 3/1/22  Yes Dayron Gongora MD   Multiple Vitamin (MULTI-VITAMIN PO) Take by mouth   Yes Dayron Gongora MD   Bacillus Coagulans-Inulin (PROBIOTIC) 1-250 BILLION-MG CAPS Take by mouth   Yes Dayron Gongora MD   Cyanocobalamin (B-12) 1000 MCG TABS Take by mouth   Yes Dayron Gongora MD   Zinc Sulfate (ZINC 15 PO) Take by mouth   Yes Dayron Gongora MD   Biotin 5 MG CAPS Take by mouth   Yes Dayron Gongora MD   famotidine (PEPCID) 10 MG tablet Take 1 tablet by mouth 2 times daily   Yes Dayron Gongora MD   vitamin C (ASCORBIC ACID) 500 MG tablet Take 2 tablets by mouth daily   Yes Dayron Gongora MD   abacavir-dolutegravir-lamiVUDine (TRIUMEQ) 600- MG TABS Take by mouth   Yes Dayron Gongora MD   diclofenac sodium (VOLTAREN) 1 % GEL Apply 2 g topically 4 times daily for 7 days 5/19/24 7/17/24  Vidhya Aguilera,    triamcinolone (KENALOG) 0.1 % cream Apply topically  Daily.Apply with Q tip in AS nightly 10/28/19   Rony Burris MD   esomeprazole (NEXIUM) 40 MG delayed release capsule  9/27/19   Dayron Gongora MD

## 2024-07-17 NOTE — ANESTHESIA POSTPROCEDURE EVALUATION
Department of Anesthesiology  Postprocedure Note    Patient: Arvind Buchanan  MRN: [id number]  YOB: 1945  Date of evaluation: 7/17/2024    Procedure Summary       Date: 07/17/24 Room / Location: [address]    Anesthesia Start: 0730 Anesthesia Stop: 0833    Procedure: RIGHT ULNA NERVE TRANSPOSITION (Right: Elbow) Diagnosis:       Lesion of ulnar nerve, right upper limb      (Lesion of ulnar nerve, right upper limb [G56.21])    Surgeons: Hallie Henry MD Responsible Provider: Awais Thompson MD    Anesthesia Type: general ASA Status: 2            Anesthesia Type: No value filed.    Chasity Phase I: Chasity Score: 10    Chasity Phase II: Chasity Score: 10    Anesthesia Post Evaluation    Patient location during evaluation: PACU  Patient participation: complete - patient participated  Level of consciousness: awake and alert  Pain score: 3  Airway patency: patent  Nausea & Vomiting: no nausea  Cardiovascular status: blood pressure returned to baseline  Respiratory status: acceptable  Hydration status: euvolemic  Pain management: adequate    No notable events documented.

## 2024-07-17 NOTE — ANESTHESIA PRE PROCEDURE
Department of Anesthesiology  Preprocedure Note       Name:  Arvind Buchanan   Age:  78 y.o.  :  1945                                          MRN:  [id number]         Date:  2024      Surgeon: Surgeon(s):  Hallie Henry MD    Procedure: Procedure(s):  RIGHT ULNA NERVE TRANSPOSITION    Medications prior to admission:   Prior to Admission medications    Medication Sig Start Date End Date Taking? Authorizing Provider   abacavir-dolutegravir-lamiVUDine (TRIUMEQ) 600- MG TABS Take by mouth   Yes Dayron Gongora MD   naproxen (NAPROSYN) 500 MG tablet Take 1 tablet by mouth 2 times daily (with meals) for 7 days 24  Salam, [place], DO   diclofenac sodium (VOLTAREN) 1 % GEL Apply 2 g topically 4 times daily for 7 days 24  Salam, [place], DO   triamcinolone (KENALOG) 0.1 % cream Apply topically  Daily.Apply with Q tip in AS nightly 10/28/19   Rony Burris MD   esomeprazole (NEXIUM) 40 MG delayed release capsule  19   Dayron Gongora MD   zolpidem (AMBIEN) 10 MG tablet  19   Dayron Gongora MD   dicyclomine (BENTYL) 10 MG capsule TK 1 C PO BID PRN 1/3/19   ProviderDayron MD       Current medications:    No current facility-administered medications for this encounter.     Current Outpatient Medications   Medication Sig Dispense Refill   • abacavir-dolutegravir-lamiVUDine (TRIUMEQ) 600- MG TABS Take by mouth     • naproxen (NAPROSYN) 500 MG tablet Take 1 tablet by mouth 2 times daily (with meals) for 7 days 14 tablet 0   • diclofenac sodium (VOLTAREN) 1 % GEL Apply 2 g topically 4 times daily for 7 days 56 g 1   • triamcinolone (KENALOG) 0.1 % cream Apply topically  Daily.Apply with Q tip in AS nightly 15 g 1   • esomeprazole (NEXIUM) 40 MG delayed release capsule      • zolpidem (AMBIEN) 10 MG tablet      • dicyclomine (BENTYL) 10 MG capsule TK 1 C PO BID PRN         Allergies:    Allergies   Allergen Reactions   • Simvastatin Itching     Itch

## 2024-07-17 NOTE — PROGRESS NOTES
Pt discharged via wheelchair. Pt discharged with instructions, simple sling in place and all belongings. Pt friend taking stable pt home.

## 2024-07-17 NOTE — OP NOTE
Operative Note      Patient: Arvind Buchanan  YOB: 1945  MRN: [id number]    Date of Procedure: 7/17/2024    Pre-Op Diagnosis Codes:     * Lesion of ulnar nerve, right upper limb [G56.21]    Post-Op Diagnosis: Same       Procedure(s):  RIGHT ULNA NERVE TRANSPOSITION    Surgeon(s):  Hallie Henry MD    Assistant:   First Assistant: Clementina Mobley    Anesthesia: General    Estimated Blood Loss (mL): Minimal    Complications: None    Specimens:   * No specimens in log *    Implants:  * No implants in log *      Drains: * No LDAs found *    Findings:  Infection Present At Time Of Surgery (PATOS) (choose all levels that have infection present):  No infection present  Other Findings:     Detailed Description of Procedure:       Electronically signed by Hallie Henry MD on 7/17/2024 at 8:28 AM

## 2024-07-17 NOTE — DISCHARGE INSTRUCTIONS
Keep right arm elevated, dressing dry, f/u 1 week    Call Dr. Henry for any problems and to schedule follow-up appointment #[phone]      GENERAL SURGERY DISCHARGE INSTRUCTIONS    Follow your surgeons instructions.  Follow up with your surgeon as directed.  Observe the operative area for signs of excessive bleeding.If needed apply pressure,elevate if able and contact your surgeon.  Observe the operative site for any signs of infection- such as increased pain,redness,fever greater than 101 degrees,swelling, foul odor or drainage.Contact your surgeon if any of these symptoms are present.  Keep operative site clean and dry.  Do not remove dressing unless instructed to by surgeon.  Apply ice as directed.  Avoid pulling,pushing or tugging to suture line.  If you become short of breath call your doctor or go to the ER.  Take medications as directed.  Pain medication should be taken with food.  Do not drive or operate machinery while taking narcotics.  For any problems or question call your surgeon.       ANESTHESIA DISCHARGE INSTRUCTIONS    Wear your seatbelt home.  You are under the influence of drugs-do not drink alcohol,drive,operate machinery,or make any important decisions or sign any legal documentsfor 24 hours  A responsible adult needs to be with you for 24 hours.  You may experience lightheadedness,dizziness,or sleepiness following surgery.  Rest at home today- increase activity as tolerated.  Progress slowly to a regular diet unless your physician has instructed you otherwise.Drink plenty of water.  If nausea becomes a problem call your physician.  Coughing,sore throat,and muscle aches are other side effects of anesthesia,and should improve with time.  Do not drive,operate machinery while taking narcotics.

## 2024-11-06 ENCOUNTER — HOSPITAL ENCOUNTER (OUTPATIENT)
Dept: OCCUPATIONAL THERAPY | Age: 79
Setting detail: THERAPIES SERIES
Discharge: HOME | End: 2024-11-06
Payer: MEDICARE

## 2024-11-06 DIAGNOSIS — R29.898 RIGHT HAND WEAKNESS: Primary | ICD-10-CM

## 2024-11-06 PROCEDURE — 97110 THERAPEUTIC EXERCISES: CPT

## 2024-11-06 PROCEDURE — 97166 OT EVAL MOD COMPLEX 45 MIN: CPT

## 2024-11-06 PROCEDURE — 97530 THERAPEUTIC ACTIVITIES: CPT

## 2024-11-06 NOTE — PLAN OF CARE
[facility] [address], [place] [address] office: [phone] fax: [phone]     Occupational Therapy Initial Evaluation Certification      Dear Georgina Jensen MD,    We had the pleasure of evaluating the following patient for occupational therapy services at [facility].  A summary of our findings can be found in the initial assessment below.  This includes our plan of care.  If you have any questions or concerns regarding these findings, please do not hesitate to contact me at the office phone number listed above.  Thank you for the referral.     Physician Signature:_______________________________Date:__________________  By signing above (or electronic signature), therapist’s plan is approved by physician       Occupational Therapy: TREATMENT/PROGRESS NOTE   Patient: Arvind Buchanan (78 y.o. male)   Examination Date: 2024   :  1945 MRN: [id number]   Visit #:   Insurance Allowable Auth Needed   [place] []Yes    []No    Insurance: Payor: MEDICARE / Plan: MEDICARE PART A AND B / Product Type: *No Product type* /   Insurance ID: [id number] - (Medicare)  Secondary Insurance (if applicable): [employer]   Treatment Diagnosis:     ICD-10-CM    1. Right hand weakness  R29.898    2.      Right hand sensation changes       Medical Diagnosis:  Weakness [R53.1]  s/p R ulnar nerve transposition 24 by Dr. Carl SHAY PCA CVA 24 and 24    Referring Physician: Georgina Jensen MD  PCP: Georgina Jensen MD     Plan of care signed (Y/N): sent on this date     Date of Patient follow up with Physician: 24 - Dr. Henry (ortho surgeon) for R ulnar nerve transposition follow up     Plan of Care Report: EVAL today  POC update due: (10 visits /OR AUTH LIMITS, whichever is less) 24                                             Medical History:  Date of Onset:  chronic R ulnar nerve issues, L PCA CVA

## 2024-11-14 NOTE — PLAN OF CARE
Observations:  Posture: WNL  Bandages/Dressings/Incisions: no  Edema: no    Cognitive Status:    A&O to   x4  Able to follow:   2 step commands    Communication:   WFL  Comments:     Vision:     WNL    Hearing:     WNL    Cardiopulmonary Status   [x]NT  Blood pressure:   Heart Rate: bpm  SpO2: %    Tone     WNL    Trunk Control      Good    Sensation       Light touch:     WNL and Diminished R UE S/P ulnar nerve transposition   Proprioception:   Diminished R UE Right hand     Strength/ROM    [x]All ROM WNL except as marked below    [x]All strength measured on 5 point scale    [x]UE ROM/strength deferred to OT   yes   \"*\" Indicates pain with movement      Right  Left      PROM AROM MMT Notes PROM AROM MMT Notes     CERVICAL Flexion            Extension            Sidebending            Rotation               SHOULDER Flexion (0-180)            Extension (0-45)            Abduction -C5 (0-80)            ER (0-90)            IR (0-70)             ELBOW Flexion - C6 (0-145)            Extension - C7 (145-0)            Pronation (0-90)            Supination (0-90)               WRIST Flexion - C7 (0-80)            Extension - C6 (0-70)            Radial Deviation (0-20)            Ulnar Deviation (0-45)             LUMBAR Flexion            Extension            Sidebending            Rotation             HIP Flexion   -4    4     Extension   -4    4     Abduction   -4    4     Adduction   -4    4     External Rotation   -4    4     Internal Rotation   -4    4    KNEE Flexion   4    4     Extension   4    4      ANKLE Dorsiflexion   -4    +4     Plantarflexion   -4    +4     Inversion   -4    +4     Eversion   -4    +4        Reflexes  Not tested    Coordination Testing:       Finger to Nose:        WFL  Alternating pronation/supination:   WFL  Finger/Thumb opposition:  WFL  Heel to shin (sitting or supine):      WFL  Alternating Toe Tapping:       WFL    Flexibility     NT    Functional Mobility/Balance:

## 2024-11-15 ENCOUNTER — HOSPITAL ENCOUNTER (OUTPATIENT)
Dept: PHYSICAL THERAPY | Age: 79
Setting detail: THERAPIES SERIES
Discharge: HOME | End: 2024-11-15
Payer: MEDICARE

## 2024-11-15 DIAGNOSIS — R42 DIZZINESS: ICD-10-CM

## 2024-11-15 DIAGNOSIS — Z74.09 IMPAIRED MOBILITY AND ENDURANCE: ICD-10-CM

## 2024-11-15 DIAGNOSIS — R26.89 IMBALANCE: Primary | ICD-10-CM

## 2024-11-15 DIAGNOSIS — R29.898 WEAKNESS OF BOTH HIPS: ICD-10-CM

## 2024-11-15 PROCEDURE — 97161 PT EVAL LOW COMPLEX 20 MIN: CPT

## 2024-11-15 PROCEDURE — 97530 THERAPEUTIC ACTIVITIES: CPT

## 2024-11-15 PROCEDURE — 97110 THERAPEUTIC EXERCISES: CPT

## 2024-11-18 ENCOUNTER — HOSPITAL ENCOUNTER (OUTPATIENT)
Dept: PHYSICAL THERAPY | Age: 79
Setting detail: THERAPIES SERIES
Discharge: HOME | End: 2024-11-18
Payer: MEDICARE

## 2024-11-18 PROCEDURE — 97110 THERAPEUTIC EXERCISES: CPT

## 2024-11-18 PROCEDURE — 97530 THERAPEUTIC ACTIVITIES: CPT

## 2024-11-18 NOTE — FLOWSHEET NOTE
SLS to 20s  to assist with and  demonstrate increased safety at home and in the community and reduce risk for falls.  [] Progressing: [] Met: [] Not Met: [] Adjusted  4. Patient will improve 10M Walk Test to .30m/s with no AD to demonstrate improved endurance for community ambulation.  [] Progressing: [] Met: [] Not Met: [] Adjusted    Overall Progression Towards Functional goals/ Treatment Progress Update:  [] Patient is progressing as expected towards functional goals listed.    [] Progression is slowed due to complexities/Impairments listed.  [] Progression has been slowed due to co-morbidities.  [x] Plan just implemented, too soon (<30days) to assess goals progression   [] Goals require adjustment due to lack of progress  [] Patient is not progressing as expected and requires additional follow up with physician  [] Other:     TREATMENT PLAN     Frequency/Duration: 2x/week for 6 weeks for the following treatment interventions:    Interventions:  Therapeutic Exercise (98458) including: strength training, ROM, and functional mobility  Therapeutic Activities (51523) including: functional mobility training and education.  Neuromuscular Re-education (13082) activation and proprioception, including postural re-education.    Gait Training (05872) for normalization of ambulation patterns and AD training.   Manual Therapy (24477) as indicated to include: Passive Range of Motion, Gr I-IV mobilizations, Soft Tissue Mobilization, Trigger Point Release, and Myofascial Release  Modalities as needed that may include: Cryotherapy, Electrical Stimulation, Biofeedback, Thermal Agents, and Ultrasound  Patient education on joint protection, postural re-education, activity modification, and progression of HEP    Plan: Cont POC- Continue emphasis/focus on improving proper muscle recruitment and activation/motor control patterns, modulating pain, improving soft tissue extensibility, kinesthetic sense and proprioception, and improving

## 2024-11-19 ENCOUNTER — HOSPITAL ENCOUNTER (OUTPATIENT)
Dept: OCCUPATIONAL THERAPY | Age: 79
Setting detail: THERAPIES SERIES
Discharge: HOME | End: 2024-11-19
Payer: MEDICARE

## 2024-11-19 PROCEDURE — 97110 THERAPEUTIC EXERCISES: CPT | Performed by: OCCUPATIONAL THERAPIST

## 2024-11-19 PROCEDURE — 97112 NEUROMUSCULAR REEDUCATION: CPT | Performed by: OCCUPATIONAL THERAPIST

## 2024-11-19 NOTE — FLOWSHEET NOTE
preparation with pain at or below 1/10.   [] Progressing: [] Met: [] Not Met: [] Adjusted    TREATMENT PLAN     Frequency/Duration:  1 /week for 4-6 weeks for the following treatment interventions:    Interventions:  [x] Therapeutic exercise including: strength training, ROM, including postural re-education.   [x] NMR activation and proprioception, including postural re-education.    [x] Manual therapy as indicated to include: STM  [x] Modalities as needed that may include: Thermal Agents  [x] Patient education on joint protection, postural re-education, activity modification, progression of HEP.        [] Aquatic Therapy    Plan: POC initiated as per evaluation    Electronically Signed by: Sivan CUADRA/JASPAL [id number]      Date: 11/19/2024    Note: Portions of this note have been templated and/or copied from initial evaluation, reassessments and prior notes for documentation efficiency.

## 2024-11-22 ENCOUNTER — HOSPITAL ENCOUNTER (OUTPATIENT)
Dept: PHYSICAL THERAPY | Age: 79
Setting detail: THERAPIES SERIES
Discharge: HOME | End: 2024-11-22
Payer: MEDICARE

## 2024-11-22 PROCEDURE — 97530 THERAPEUTIC ACTIVITIES: CPT

## 2024-11-22 PROCEDURE — 97110 THERAPEUTIC EXERCISES: CPT

## 2024-11-22 NOTE — FLOWSHEET NOTE
[facility] - Outpatient Rehabilitation and Therapy [address], [place] [address] office: [phone] fax: [phone]         Physical Therapy: TREATMENT/PROGRESS NOTE   Patient: Arvind Buchanan (79 y.o. male)   Examination Date: 2024   :  1945 MRN: [id number]   Visit #: Visit count could not be calculated. Make sure you are using a visit which is associated with an episode. 12  Insurance Allowable Auth Needed   mn []Yes    [x]No    Insurance: Payor: MEDICARE / Plan: MEDICARE PART A AND B / Product Type: *No Product type* /   Insurance ID: [id number] - (Medicare)  Secondary Insurance (if applicable): [employer]   Treatment Diagnosis:     ICD-10-CM    1. Imbalance  R26.89       2. Dizziness  R42       3. Impaired mobility and endurance  Z74.09       4. Weakness of both hips  R29.898          Medical Diagnosis:  Weakness [R53.1]   Referring Physician: Georgina Jensen MD  PCP: Georgina Jensen MD       Plan of care signed (Y/N):     Date of Patient follow up with Physician:      Plan of Care Report: NO  POC update due: (10 visits /OR AUTH LIMITS, whichever is less)  2024                                             Medical History:  Comorbidities:  Hypertension  Other: Dizziness   Relevant Medical History: hyperlipidemia                                         Precautions/ Contra-indications:           Latex allergy:  NO  Pacemaker:    NO  Contraindications for Manipulation: None  Date of Surgery:   Other:    Red Flags:  None    Suicide Screening:   The patient did not verbalize a primary behavioral concern, suicidal ideation, suicidal intent, or demonstrate suicidal behaviors.    Preferred Language for Healthcare:   [x] English       [] other:    SUBJECTIVE EXAMINATION     Patient stated complaint/comment:   Reports being off today and the last couple days, is more dizzy today.  States that's become is normal since his CVA.  BP was about 130/70's this am.

## 2024-11-26 ENCOUNTER — HOSPITAL ENCOUNTER (OUTPATIENT)
Dept: OCCUPATIONAL THERAPY | Age: 79
Setting detail: THERAPIES SERIES
Discharge: HOME | End: 2024-11-26
Payer: MEDICARE

## 2024-11-26 ENCOUNTER — HOSPITAL ENCOUNTER (OUTPATIENT)
Dept: PHYSICAL THERAPY | Age: 79
Setting detail: THERAPIES SERIES
Discharge: HOME | End: 2024-11-26
Payer: MEDICARE

## 2024-11-26 PROCEDURE — 97112 NEUROMUSCULAR REEDUCATION: CPT

## 2024-11-26 PROCEDURE — 97110 THERAPEUTIC EXERCISES: CPT | Performed by: OCCUPATIONAL THERAPIST

## 2024-11-26 PROCEDURE — 97112 NEUROMUSCULAR REEDUCATION: CPT | Performed by: OCCUPATIONAL THERAPIST

## 2024-11-26 PROCEDURE — 97110 THERAPEUTIC EXERCISES: CPT

## 2024-11-26 NOTE — FLOWSHEET NOTE
participation in sports/recreation    Barriers to/and or personal factors that will affect rehab potential:   Age  Co-morbidities  past PT/medical experience    Occupational Therapy Evaluation Complexity Justification  [x] A history of present problem and 1-2 personal factors and/or co-morbidities that impact the plan of care  [x] A total of 3 elements found upon examination of body systems using standardized tests and measures addressing any of the following: body structures, functions (impairments), activity limitations, and/or participation restrictions  [x] A clinical presentation with evolving clinical presentation with changing characteristics  [x] Clinical decision making of MODERATE (97841 - Typically 30 minutes face-to-face) complexity using standardized patient assessment instrument and/or measurable assessment of functional outcome.    Today's Assessment:   See above    Medical Necessity Documentation:  I certify that this patient meets the below criteria necessary for medical necessity for care and/or justification of therapy services:  The patient has functional impairments and/or activity limitations and would benefit from continued outpatient therapy services to address the deficits outlined in the patients goals  The patient has associated co-morbidities along with primary diagnosis which significantly impact the rate of recovery and contribute to complexities that require skilled therapeutic intervention    Prognosis for POC: [x] Good [] Fair  [] Poor    Patient requires continued skilled intervention: [x] Yes  [] No      CHARGE CAPTURE     OT CHARGE GRID   CPT Code (TIMED) minutes # CPT Code (UNTIMED) #     Therex (20180)  15 2  EVAL:MODERATE (64174 - Typically 45 minutes face-to-face)     Neuromusc. Re-ed (74729) 15 1  Re-Eval (43674)     Manual (41804)    Estim Unattended (98561)     Ther. Act (31816)    Parraffin (34825)     Gait (45839)    Fluidotherapy (03316)     ADL Training (21476)    Dry

## 2024-11-26 NOTE — FLOWSHEET NOTE
assist with reaching prior level of function with activities such as ADLs.  [] Progressing: [] Met: [] Not Met: [] Adjusted  2.    [] Progressing: [] Met: [] Not Met: [] Adjusted  3. Pt will improve SLS to 20s  to assist with and  demonstrate increased safety at home and in the community and reduce risk for falls.  [] Progressing: [] Met: [] Not Met: [] Adjusted  4. Patient will improve 10M Walk Test to .30m/s with no AD to demonstrate improved endurance for community ambulation.  [] Progressing: [] Met: [] Not Met: [] Adjusted    Overall Progression Towards Functional goals/ Treatment Progress Update:  [] Patient is progressing as expected towards functional goals listed.    [] Progression is slowed due to complexities/Impairments listed.  [] Progression has been slowed due to co-morbidities.  [x] Plan just implemented, too soon (<30days) to assess goals progression   [] Goals require adjustment due to lack of progress  [] Patient is not progressing as expected and requires additional follow up with physician  [] Other:     TREATMENT PLAN     Frequency/Duration: 2x/week for 6 weeks for the following treatment interventions:    Interventions:  Therapeutic Exercise (61651) including: strength training, ROM, and functional mobility  Therapeutic Activities (60027) including: functional mobility training and education.  Neuromuscular Re-education (85031) activation and proprioception, including postural re-education.    Gait Training (65180) for normalization of ambulation patterns and AD training.   Manual Therapy (71065) as indicated to include: Passive Range of Motion, Gr I-IV mobilizations, Soft Tissue Mobilization, Trigger Point Release, and Myofascial Release  Modalities as needed that may include: Cryotherapy, Electrical Stimulation, Biofeedback, Thermal Agents, and Ultrasound  Patient education on joint protection, postural re-education, activity modification, and progression of HEP    Plan: Cont POC- Continue

## 2024-11-29 ENCOUNTER — HOSPITAL ENCOUNTER (OUTPATIENT)
Dept: PHYSICAL THERAPY | Age: 79
Setting detail: THERAPIES SERIES
Discharge: HOME | End: 2024-11-29
Payer: MEDICARE

## 2024-11-29 PROCEDURE — 97110 THERAPEUTIC EXERCISES: CPT

## 2024-11-29 PROCEDURE — 97112 NEUROMUSCULAR REEDUCATION: CPT

## 2024-11-29 NOTE — FLOWSHEET NOTE
Agents, and Ultrasound  Patient education on joint protection, postural re-education, activity modification, and progression of HEP    Plan: Cont POC- Continue emphasis/focus on improving proper muscle recruitment and activation/motor control patterns, modulating pain, improving soft tissue extensibility, kinesthetic sense and proprioception, and improving postural awareness. Next visit plan to continue current phase     Electronically Signed by Jay Wilson, PT  Date: 11/29/2024      Note: Portions of this note have been templated and/or copied from initial evaluation, reassessments and prior notes for documentation efficiency.      Note: If patient does not return for scheduled/recommended follow up visits, this note will serve as a discharge from care along with the most recent update on progress.

## 2024-12-02 ENCOUNTER — APPOINTMENT (OUTPATIENT)
Dept: OCCUPATIONAL THERAPY | Age: 79
End: 2024-12-02
Payer: MEDICARE

## 2024-12-02 ENCOUNTER — HOSPITAL ENCOUNTER (OUTPATIENT)
Dept: PHYSICAL THERAPY | Age: 79
Setting detail: THERAPIES SERIES
Discharge: HOME | End: 2024-12-02
Payer: MEDICARE

## 2024-12-02 PROCEDURE — 97112 NEUROMUSCULAR REEDUCATION: CPT

## 2024-12-02 PROCEDURE — 97110 THERAPEUTIC EXERCISES: CPT

## 2024-12-02 NOTE — FLOWSHEET NOTE
[facility] - Outpatient Rehabilitation and Therapy [address], [place] [address] office: [phone] fax: [phone]         Physical Therapy: TREATMENT/PROGRESS NOTE   Patient: Arvind Buchanan (79 y.o. male)   Examination Date: 2024   :  1945 MRN: [id number]   Visit #:   Insurance Allowable Auth Needed   mn []Yes    [x]No    Insurance: Payor: MEDICARE / Plan: MEDICARE PART A AND B / Product Type: *No Product type* /   Insurance ID: [id number] - (Medicare)  Secondary Insurance (if applicable): MED[employer] M[employer]UAL   Treatment Diagnosis:     ICD-10-CM    1. Imbalance  R26.89       2. Dizziness  R42       3. Impaired mobility and endurance  Z74.09       4. Weakness of both hips  R29.898          Medical Diagnosis:  Weakness [R53.1]   Referring Physician: Georgina Jensen MD  PCP: Georgina Jensen MD       Plan of care signed (Y/N):     Date of Patient follow up with Physician:      Plan of Care Report: NO  POC update due: (10 visits /OR AUTH LIMITS, whichever is less)  2024                                             Medical History:  Comorbidities:  Hypertension  Other: Dizziness   Relevant Medical History: hyperlipidemia                                         Precautions/ Contra-indications:           Latex allergy:  NO  Pacemaker:    NO  Contraindications for Manipulation: None  Date of Surgery:   Other:    Red Flags:  None    Suicide Screening:   The patient did not verbalize a primary behavioral concern, suicidal ideation, suicidal intent, or demonstrate suicidal behaviors.    Preferred Language for Healthcare:   [x] English       [] other:    SUBJECTIVE EXAMINATION     Patient stated complaint/comment: : Patient states that he did well going to the [employer] game over the weekend .     : states that dizziness comes and goes. Says that he is learning his boundaries with energy .        Doing okay this am, no pain.  Dizziness is improving, hasn't been

## 2024-12-03 ENCOUNTER — APPOINTMENT (OUTPATIENT)
Dept: OCCUPATIONAL THERAPY | Age: 79
End: 2024-12-03
Payer: MEDICARE

## 2024-12-04 ENCOUNTER — HOSPITAL ENCOUNTER (OUTPATIENT)
Dept: PHYSICAL THERAPY | Age: 79
Setting detail: THERAPIES SERIES
Discharge: HOME | End: 2024-12-04
Payer: MEDICARE

## 2024-12-04 PROCEDURE — 97112 NEUROMUSCULAR REEDUCATION: CPT

## 2024-12-04 PROCEDURE — 97110 THERAPEUTIC EXERCISES: CPT

## 2024-12-04 NOTE — DISCHARGE SUMMARY
emergency surgical intervention, MRI cervical spine could be helpful for further evaluation. Otherwise, evaluation would typically be performed as an outpatient.     MRI 8/14/24:  IMPRESSION: Brain 1. Unchanged recent left PCA distribution infarcts involving the hypothalamus and posterior thalamus. Possible punctate focus of restricted diffusion in the left occipital periventricular region not discretely seen on the prior may represent a focus of acute lacunar ischemia. 2. Mild chronic microvascular disease. Remote right cerebellar lacunar infarcts. Cranial MRA 1. 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 Moderate right P2 segment stenosis and a possible short segment right M2 segment stenosis, favored unchanged. 3. Hypoplastic right V4 segment, with no discernible signal noted in its distal aspect, potentially technical. 4. Otherwise no new large vessel occlusion identified. Extracranial Neck MRA 1. No flow significant cervical carotid or vertebral artery stenosis       OBJECTIVE EXAMINATION     11/15:  See eval      Exercises/Interventions     Therapeutic Ex (62375)  Resistance Sets/time Reps Notes/Cues/Progressions   Bike     #1 5.0     IB stretch / HR  2x30\" / 2x15     Total Gym  -squats w/ [place] lift  -hip abd  -SL squats   7lb   2  2   15  15 B  10 B 12/4   Standing HSS at step 12\" 2x30\" B            NMR re-education (89191)       Retro- TM   Lateral -TM 1.5 mph  0.6 mph 2'  11/22   Shuttle balance  -fwd step overs  -Hip Flex      11/29                               Therapeutic Activity (83486)       Fwd step ups SLS  Lat step ups w/ contra hip abd 6\"  6\" 1 12 B 11/22   Lunge on BOSU (blue)  Lunge on step  [place] on BOSU   6\"   1 12 B  10 B  12/4   Airex:  Hip abd/ ext w/ contralateral LE        11/22          Discussed following up w/spine specialist to rule out any abnormalities regarding cord compression at C2/C3                     Manual Intervention (12076) Time:                 Gait Training (00319)

## 2025-02-07 ENCOUNTER — TELEPHONE (OUTPATIENT)
Dept: NEUROLOGY | Age: 80
End: 2025-02-07

## 2025-02-07 NOTE — TELEPHONE ENCOUNTER
Referral faxed to and printed from [facility] fax box.  Pt called, spoke with Jesenia, and stated he had a hand written referral for EMG and that is what we received via fax (scanned to media).  Very difficult to decipher what is written on the Rx pad.     Pls contact pt to schedule.

## 2025-02-13 ENCOUNTER — HOSPITAL ENCOUNTER (OUTPATIENT)
Dept: MRI IMAGING | Age: 80
Discharge: HOME | End: 2025-02-13
Payer: MEDICARE

## 2025-02-13 DIAGNOSIS — I42.9 CARDIOMYOPATHY, UNSPECIFIED TYPE (HCC): ICD-10-CM

## 2025-02-13 PROCEDURE — A9585 GADOBUTROL INJECTION: HCPCS | Performed by: INTERNAL MEDICINE

## 2025-02-13 PROCEDURE — 75559 CARDIAC MRI W/STRESS IMG: CPT

## 2025-02-13 PROCEDURE — 6360000004 HC RX CONTRAST MEDICATION: Performed by: INTERNAL MEDICINE

## 2025-02-13 RX ORDER — GADOBUTROL 604.72 MG/ML
12 INJECTION INTRAVENOUS
Status: COMPLETED | OUTPATIENT
Start: 2025-02-13 | End: 2025-02-13

## 2025-02-13 RX ADMIN — GADOBUTROL 12 ML: 604.72 INJECTION INTRAVENOUS at 13:36

## 2025-03-04 ENCOUNTER — PROCEDURE VISIT (OUTPATIENT)
Dept: NEUROLOGY | Age: 80
End: 2025-03-04

## 2025-03-04 DIAGNOSIS — R20.0 NUMBNESS AND TINGLING OF RIGHT HAND: Primary | ICD-10-CM

## 2025-03-04 DIAGNOSIS — R20.2 NUMBNESS AND TINGLING OF RIGHT HAND: Primary | ICD-10-CM

## 2025-03-04 NOTE — PROGRESS NOTES
Dear Hallie Henry MD  [address] [place] [address]    I had the pleasure of seeing your patient Arvind Buchanan  today for EMG and NCV. I have attached a detailed summary below:        Electromyography report        [facility]  [address] [place] [address], [address]  [place] [address]      Patient: Arvind Buchanan    MR Number: [id number]  YOB: 1945  Date of Visit: 3/4/2025    Clinical history:  The patient is a 79 y.o. years old male with chronic right hand numbness and tingling more ulnar distribution status post ulnar release surgery.  On exam: Decrease sensation in right ulnar digital distribution.  No atrophy or weakness.    Findings:   For full details about such findings, please see attached report. Needle examination was recorded using monopolar needle in selected muscles. Unless otherwise noted, the temperature of the limb was monitored and maintained between 32-36°C during the performance of the NCV testing.     1.  Right median sensory distal latency and amplitude were within normal limits.  2.  Right ulnar sensory response was absent  3.  Right median motor distal latency, amplitude and conduction velocities were within normal limits.  4.  Right ulnar motor distal latency, amplitudes and conduction velocities were within normal limits.  5.  Right radial sensory distal latency and amplitude were within normal limits.  6.  Needle examinations of the right upper extremity was performed in selected muscles. Needle examination of these selected muscle showed normal insertional activities, morphology, amplitude, and recruitment of motor unit potential.      Impression:    This test is abnormal. 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   No evidence of polyneuropathy, plexopathy, or radiculopathy.      Grisel Estrella MD    Diplomate of the [employer] board of

## 2025-05-08 RX ORDER — METOPROLOL SUCCINATE 25 MG/1
25 TABLET, EXTENDED RELEASE ORAL NIGHTLY
COMMUNITY
Start: 2024-08-10

## 2025-05-08 RX ORDER — PANTOPRAZOLE SODIUM 40 MG/1
TABLET, DELAYED RELEASE ORAL
COMMUNITY
Start: 2024-08-23

## 2025-05-08 RX ORDER — LISINOPRIL 2.5 MG/1
2.5 TABLET ORAL DAILY
COMMUNITY
Start: 2025-03-20

## 2025-05-08 RX ORDER — SENNOSIDES 8.6 MG
325 CAPSULE ORAL
COMMUNITY
Start: 2024-08-11

## 2025-05-08 RX ORDER — ATORVASTATIN CALCIUM 80 MG/1
80 TABLET, FILM COATED ORAL NIGHTLY
COMMUNITY
Start: 2024-08-10

## 2025-05-08 NOTE — PROGRESS NOTES
DATE 5/14/2025____ PLACE ____  [address]       TIME __0845__                                   __x__  [address]      ARRIVAL TIME _0715__                                                                             SURGEONS OFFICE TO GIVE ARRIVAL TIME ___                                    IF YOU HAVE NOT RECEIVED A TIME CONTACT YOUR SURGEON                                     THE FOLLOWING THINGS NEED TO BE DONE OR YOUR SURGERY WILL BE CANCELLED    NOTHING TO EAT OR DRINK AFTER MIDNIGHT THE NIGHT BEFORE. YOU MAY TAKE ONLY THE FOLLOWING MEDICATIONS WITH A SIP OF WATER ON THE MORNING OF YOUR SURGERY.  _pantoprazole and  Davato_____________________________________________________________________________________________YOU MAY BRUSH YOUR TEETH.    2.   A HISTORY/PHYSICAL MUST BE COMPLETED AND DATED WITHIN 30 DAYS OF YOUR SURGERY BY YOUR PCP __                                                                                                                                                                                                 SURGEON x__                                                                                                                                                                                                 HOSPITALIST __    3.  THE FOLLOWING MUST BE DONE WITHIN 30 DAYS OF SURGERY. LAB __  EKG __ CHEST XRAY __ TO BE DONE BY ____  NOT APPICABLE__x______    4.   IF YOU TAKE A LONG ACTING INSULIN AT NIGHT, CUT YOUR NORMAL DOSE IN HALF, AND TAKE NO DIABETIC MEDCATION IN THE MORNING.    5.   IF YOU TAKE A GLP-1 RECEPTOR (SUCH AS TRULICITY, MOUNJARO, OZEMPIC-WEEKLY), YOU MUST BE OFF x 7 DAYS. IF YOU TAKE A DAILY DOSE SUCH AS RYBELSUS,      YOU MUST STOP 24 HOURS PRIOR TO SURGERY. LAST DOSE_n/a_______    6.  IF YOU TAKE A SGLT2 INHIBITOR  (SUCH AS FARXIGA, JARDIANCE, INVOKANA OR SYNJARDY), YOU MUST STOP 3 DAYS PRIOR TO SURGERY. LAST DOSE__n/a_____    7.  IF YOU TAKE A BLOOD THINNER (SUCH AS  PLAVIX, ELIQUIS, XARELTO, WARFARIN) OR AN ANTIPLATELET SUCH AS PLETAL. MAKE SURE YOU GET INSTUCTIONS FROM YOUR SURGEON AND PRESCRIBING DRMann AS TO WHEN IT NEEDS TO BE STOPPED. LAST DOSE__n/a____    8.  IF YOU TAKE ANY MEDICATIONS FOR WEIGHT LOSS (SUCH AS ADIPEX, NALTREXONE, PHENTERMINE) YOU MUST STOP X 3 DAYS. LAST DOSE_n/a_____    9.  ASPIRIN, IBUPROFEN, ADVIL, ANTI INFLAMMATORIES, ALONG WITH VITAMINS AND SUPPLEMENTS SHOULD BE STOPPED FOR 5-7 DAYS. TYLENOL IS OK TO TAKE.    10. YOU MUST HAVE A RESPONSIBLE ADULT, AGE 18 OR OLDER TO STAY WITH YOU AND DRIVE YOU HOME. A PARENT OR LEGAL GUARDIAN MUST STAY ON SITE THE ENTIRE TIME A CHILD IS HERE.    11. FOLLOW YOUR SURGEONS INSTRUCTIONS FOR SHOWERING PRIOR TO SURGERY.    12. IF YOU HAVE NOT BEEN GIVEN SPECIFIC INSTRUCTIONS, SHOWER THE MORNING OF WITH AN ANTIBACTERIAL SOAP, SUCH AS DIAL.    13. FOLLOW YOUR SURGEONS INSTRUCTIONS IF THEY HAVE GIVEN YOU A BOWEL PREP TO DO BEFORE SURGERY.    14. IF YOU DEVELOP AN ILLNESS PRIOR TO SURGERY LET YOUR SURGEON KNOW (COUGH , FEVER, COLD, SORE THROAT,  NAUSEA OR VOMITING)    15. IF YOU HAVE ANY SKIN BREAKDOWN, SIGN OF INFECTION OR DENTAL ISSUES CONTACT YOUR SURGEON.    16. IF WE HAVE NOT REACHED YOU TO GIVE SPECIFIC INSTRUCTIONS ON WHAT MEDICATIONS TO TAKE THE MORNING OF SURGERY YOU MAY TAKE YOUR HEART, BLOOD PRESSURE, THYROID,SEIZURE MEDICATIONS AND USE YOUR INHALERS. DO NOT TAKE BLOOD PRESSURE MEDICATIONS ENDING IN \"PRIL\" or \"SARTAN\" THE MORNING OF OR JUSTICE PRIOR TO SURGERY.                                                                               GENERAL INSTRUCTIONS     Surgery dates, times and arrival times are subject to change. Please check with your surgeon. MAKE SURE YOUR VOICEMAIL IS SET UP AND NOT FULL so you are able to receive messages regarding your surgery.  Bring a photo ID,insurance cards and form of payment. There is a pharmacy on site.  Bring a complete list of your medications, including vitamins and supplements.

## 2025-05-12 NOTE — H&P
[place] [address]                            PREOPERATIVE H&P      PATIENT NAME: JENNIFER ACEVEDO               : 1945  MED REC NO: [id number]                      ROOM:   ACCOUNT NO: [id number]                       ADMIT DATE: 2025  PROVIDER: Serenity Henry MD      This is for .    DIAGNOSIS:  Right cubital tunnel syndrome.    PLANNED PROCEDURE:  Exploration of right elbow with decompression of ulnar nerve.    INDICATION:  The patient previously underwent a right ulnar nerve submuscular transposition back in 2024.  Although the pain has been completely resolved, he continued to complain of persistent numbness and paresthesias.  A repeat EMG nerve conduction study that was obtained on  showed persistent absence of the ulnar sensory response.  Therefore after discussing further options, he now elects to proceed with exploration and decompression of the ulnar nerve itself.  Nevertheless, the issue of bleeding, infection, wound scarring as well as the issue of persistent sensory loss was discussed, and consent was obtained.    PAST MEDICAL HISTORY:  Consistent with coronary artery disease and high blood pressure.    ALLERGIES:  DENIES ANY DRUG ALLERGIES.      MEDICATIONS:  He does take esomeprazole, zolpidem.    SOCIAL HISTORY:  He is a nonsmoker.    EXAMINATION:  GENERAL:  Again reveals a pleasant, alert male, in no apparent distress.  VITAL SIGNS:  Stable.  LUNGS:  Clear.  HEART:  Regular rate and rhythm.  EXTREMITIES:  Showed strength 3 to 4 out of 5 in this right hand.  No obvious deformity is noted.    PLAN:  Again is exploration of the right ulnar nerve and decompression.          SERENITY HENRY MD      D:  2025 20:51:29     T:  2025 21:11:08     [facility]/S  Job #:  [id number]     Doc#:  [id number]

## 2025-05-14 ENCOUNTER — ANESTHESIA EVENT (OUTPATIENT)
Dept: OPERATING ROOM | Age: 80
End: 2025-05-14
Payer: MEDICARE

## 2025-05-14 ENCOUNTER — ANESTHESIA (OUTPATIENT)
Dept: OPERATING ROOM | Age: 80
End: 2025-05-14
Payer: MEDICARE

## 2025-05-14 ENCOUNTER — HOSPITAL ENCOUNTER (OUTPATIENT)
Age: 80
Setting detail: OUTPATIENT SURGERY
Discharge: HOME | End: 2025-05-14
Attending: PLASTIC SURGERY | Admitting: PLASTIC SURGERY
Payer: MEDICARE

## 2025-05-14 VITALS
BODY MASS INDEX: 20.32 KG/M2 | TEMPERATURE: 97.8 F | DIASTOLIC BLOOD PRESSURE: 81 MMHG | HEIGHT: 72 IN | SYSTOLIC BLOOD PRESSURE: 143 MMHG | OXYGEN SATURATION: 99 % | HEART RATE: 55 BPM | RESPIRATION RATE: 15 BRPM | WEIGHT: 150 LBS

## 2025-05-14 DIAGNOSIS — G56.21 LESION OF RIGHT ULNAR NERVE: Primary | ICD-10-CM

## 2025-05-14 PROCEDURE — 2500000003 HC RX 250 WO HCPCS: Performed by: PLASTIC SURGERY

## 2025-05-14 PROCEDURE — 3700000000 HC ANESTHESIA ATTENDED CARE: Performed by: PLASTIC SURGERY

## 2025-05-14 PROCEDURE — 6360000002 HC RX W HCPCS: Performed by: PLASTIC SURGERY

## 2025-05-14 PROCEDURE — 6360000002 HC RX W HCPCS: Performed by: REGISTERED NURSE

## 2025-05-14 PROCEDURE — 2580000003 HC RX 258: Performed by: PLASTIC SURGERY

## 2025-05-14 PROCEDURE — 7100000001 HC PACU RECOVERY - ADDTL 15 MIN: Performed by: PLASTIC SURGERY

## 2025-05-14 PROCEDURE — 3600000013 HC SURGERY LEVEL 3 ADDTL 15MIN: Performed by: PLASTIC SURGERY

## 2025-05-14 PROCEDURE — 2500000003 HC RX 250 WO HCPCS: Performed by: REGISTERED NURSE

## 2025-05-14 PROCEDURE — 3600000003 HC SURGERY LEVEL 3 BASE: Performed by: PLASTIC SURGERY

## 2025-05-14 PROCEDURE — 7100000010 HC PHASE II RECOVERY - FIRST 15 MIN: Performed by: PLASTIC SURGERY

## 2025-05-14 PROCEDURE — 7100000011 HC PHASE II RECOVERY - ADDTL 15 MIN: Performed by: PLASTIC SURGERY

## 2025-05-14 PROCEDURE — 2580000003 HC RX 258: Performed by: REGISTERED NURSE

## 2025-05-14 PROCEDURE — 3700000001 HC ADD 15 MINUTES (ANESTHESIA): Performed by: PLASTIC SURGERY

## 2025-05-14 PROCEDURE — 2709999900 HC NON-CHARGEABLE SUPPLY: Performed by: PLASTIC SURGERY

## 2025-05-14 PROCEDURE — 7100000000 HC PACU RECOVERY - FIRST 15 MIN: Performed by: PLASTIC SURGERY

## 2025-05-14 RX ORDER — LIDOCAINE HYDROCHLORIDE 20 MG/ML
INJECTION, SOLUTION INFILTRATION; PERINEURAL
Status: DISCONTINUED | OUTPATIENT
Start: 2025-05-14 | End: 2025-05-14 | Stop reason: SDUPTHER

## 2025-05-14 RX ORDER — HYDROMORPHONE HYDROCHLORIDE 2 MG/ML
0.25 INJECTION, SOLUTION INTRAMUSCULAR; INTRAVENOUS; SUBCUTANEOUS EVERY 5 MIN PRN
Status: DISCONTINUED | OUTPATIENT
Start: 2025-05-14 | End: 2025-05-14 | Stop reason: HOSPADM

## 2025-05-14 RX ORDER — MAGNESIUM HYDROXIDE 1200 MG/15ML
LIQUID ORAL CONTINUOUS PRN
Status: DISCONTINUED | OUTPATIENT
Start: 2025-05-14 | End: 2025-05-14 | Stop reason: HOSPADM

## 2025-05-14 RX ORDER — HYDROCODONE BITARTRATE AND ACETAMINOPHEN 5; 325 MG/1; MG/1
1 TABLET ORAL EVERY 4 HOURS PRN
Qty: 25 TABLET | Refills: 0 | Status: SHIPPED | OUTPATIENT
Start: 2025-05-14 | End: 2025-05-21

## 2025-05-14 RX ORDER — DEXMEDETOMIDINE HYDROCHLORIDE 100 UG/ML
INJECTION, SOLUTION INTRAVENOUS
Status: DISCONTINUED | OUTPATIENT
Start: 2025-05-14 | End: 2025-05-14 | Stop reason: SDUPTHER

## 2025-05-14 RX ORDER — GLYCOPYRROLATE 0.2 MG/ML
INJECTION INTRAMUSCULAR; INTRAVENOUS
Status: DISCONTINUED | OUTPATIENT
Start: 2025-05-14 | End: 2025-05-14 | Stop reason: SDUPTHER

## 2025-05-14 RX ORDER — NALOXONE HYDROCHLORIDE 0.4 MG/ML
INJECTION, SOLUTION INTRAMUSCULAR; INTRAVENOUS; SUBCUTANEOUS PRN
Status: DISCONTINUED | OUTPATIENT
Start: 2025-05-14 | End: 2025-05-14 | Stop reason: HOSPADM

## 2025-05-14 RX ORDER — BUPIVACAINE HYDROCHLORIDE 5 MG/ML
INJECTION, SOLUTION EPIDURAL; INTRACAUDAL
Status: DISCONTINUED | OUTPATIENT
Start: 2025-05-14 | End: 2025-05-14 | Stop reason: HOSPADM

## 2025-05-14 RX ORDER — SODIUM CHLORIDE 0.9 % (FLUSH) 0.9 %
5-40 SYRINGE (ML) INJECTION EVERY 12 HOURS SCHEDULED
Status: DISCONTINUED | OUTPATIENT
Start: 2025-05-14 | End: 2025-05-14 | Stop reason: HOSPADM

## 2025-05-14 RX ORDER — SODIUM CHLORIDE 0.9 % (FLUSH) 0.9 %
5-40 SYRINGE (ML) INJECTION PRN
Status: DISCONTINUED | OUTPATIENT
Start: 2025-05-14 | End: 2025-05-14 | Stop reason: HOSPADM

## 2025-05-14 RX ORDER — CEFAZOLIN SODIUM 1 G/3ML
INJECTION, POWDER, FOR SOLUTION INTRAMUSCULAR; INTRAVENOUS
Status: DISCONTINUED | OUTPATIENT
Start: 2025-05-14 | End: 2025-05-14 | Stop reason: HOSPADM

## 2025-05-14 RX ORDER — SODIUM CHLORIDE 9 MG/ML
INJECTION, SOLUTION INTRAVENOUS PRN
Status: DISCONTINUED | OUTPATIENT
Start: 2025-05-14 | End: 2025-05-14 | Stop reason: HOSPADM

## 2025-05-14 RX ORDER — FENTANYL CITRATE 50 UG/ML
INJECTION, SOLUTION INTRAMUSCULAR; INTRAVENOUS
Status: DISCONTINUED | OUTPATIENT
Start: 2025-05-14 | End: 2025-05-14 | Stop reason: SDUPTHER

## 2025-05-14 RX ORDER — PROPOFOL 10 MG/ML
INJECTION, EMULSION INTRAVENOUS
Status: DISCONTINUED | OUTPATIENT
Start: 2025-05-14 | End: 2025-05-14 | Stop reason: SDUPTHER

## 2025-05-14 RX ORDER — OXYCODONE HYDROCHLORIDE 5 MG/1
5 TABLET ORAL PRN
Status: DISCONTINUED | OUTPATIENT
Start: 2025-05-14 | End: 2025-05-14 | Stop reason: HOSPADM

## 2025-05-14 RX ORDER — LIDOCAINE HYDROCHLORIDE 10 MG/ML
0.5 INJECTION, SOLUTION EPIDURAL; INFILTRATION; INTRACAUDAL; PERINEURAL ONCE
Status: DISCONTINUED | OUTPATIENT
Start: 2025-05-14 | End: 2025-05-14 | Stop reason: HOSPADM

## 2025-05-14 RX ORDER — SODIUM CHLORIDE 9 MG/ML
INJECTION, SOLUTION INTRAVENOUS
Status: DISCONTINUED | OUTPATIENT
Start: 2025-05-14 | End: 2025-05-14 | Stop reason: SDUPTHER

## 2025-05-14 RX ORDER — ONDANSETRON 2 MG/ML
INJECTION INTRAMUSCULAR; INTRAVENOUS
Status: DISCONTINUED | OUTPATIENT
Start: 2025-05-14 | End: 2025-05-14 | Stop reason: SDUPTHER

## 2025-05-14 RX ORDER — FENTANYL CITRATE 50 UG/ML
50 INJECTION, SOLUTION INTRAMUSCULAR; INTRAVENOUS EVERY 5 MIN PRN
Status: DISCONTINUED | OUTPATIENT
Start: 2025-05-14 | End: 2025-05-14 | Stop reason: HOSPADM

## 2025-05-14 RX ORDER — DEXAMETHASONE SODIUM PHOSPHATE 4 MG/ML
INJECTION, SOLUTION INTRA-ARTICULAR; INTRALESIONAL; INTRAMUSCULAR; INTRAVENOUS; SOFT TISSUE
Status: DISCONTINUED | OUTPATIENT
Start: 2025-05-14 | End: 2025-05-14 | Stop reason: SDUPTHER

## 2025-05-14 RX ORDER — SODIUM CHLORIDE, SODIUM LACTATE, POTASSIUM CHLORIDE, CALCIUM CHLORIDE 600; 310; 30; 20 MG/100ML; MG/100ML; MG/100ML; MG/100ML
INJECTION, SOLUTION INTRAVENOUS CONTINUOUS
Status: DISCONTINUED | OUTPATIENT
Start: 2025-05-14 | End: 2025-05-14 | Stop reason: HOSPADM

## 2025-05-14 RX ORDER — OXYCODONE HYDROCHLORIDE 5 MG/1
10 TABLET ORAL PRN
Status: DISCONTINUED | OUTPATIENT
Start: 2025-05-14 | End: 2025-05-14 | Stop reason: HOSPADM

## 2025-05-14 RX ADMIN — LIDOCAINE HYDROCHLORIDE 100 MG: 20 INJECTION, SOLUTION INFILTRATION; PERINEURAL at 08:03

## 2025-05-14 RX ADMIN — FENTANYL CITRATE 50 MCG: 50 INJECTION, SOLUTION INTRAMUSCULAR; INTRAVENOUS at 08:03

## 2025-05-14 RX ADMIN — PHENYLEPHRINE HYDROCHLORIDE 200 MCG: 10 INJECTION INTRAVENOUS at 08:11

## 2025-05-14 RX ADMIN — DEXMEDETOMIDINE HYDROCHLORIDE 4 MCG: 100 INJECTION, SOLUTION INTRAVENOUS at 08:55

## 2025-05-14 RX ADMIN — FENTANYL CITRATE 50 MCG: 50 INJECTION, SOLUTION INTRAMUSCULAR; INTRAVENOUS at 08:55

## 2025-05-14 RX ADMIN — SODIUM CHLORIDE: 9 INJECTION, SOLUTION INTRAVENOUS at 08:00

## 2025-05-14 RX ADMIN — PROPOFOL 150 MG: 10 INJECTION, EMULSION INTRAVENOUS at 08:03

## 2025-05-14 RX ADMIN — ONDANSETRON 4 MG: 2 INJECTION INTRAMUSCULAR; INTRAVENOUS at 08:12

## 2025-05-14 RX ADMIN — DEXMEDETOMIDINE HYDROCHLORIDE 6 MCG: 100 INJECTION, SOLUTION INTRAVENOUS at 08:16

## 2025-05-14 RX ADMIN — CEFAZOLIN 2000 MG: 2 INJECTION, POWDER, FOR SOLUTION INTRAMUSCULAR; INTRAVENOUS at 08:12

## 2025-05-14 RX ADMIN — DEXAMETHASONE SODIUM PHOSPHATE 4 MG: 4 INJECTION, SOLUTION INTRAMUSCULAR; INTRAVENOUS at 08:12

## 2025-05-14 RX ADMIN — GLYCOPYRROLATE 0.2 MG: 0.2 INJECTION, SOLUTION INTRAMUSCULAR; INTRAVENOUS at 08:10

## 2025-05-14 RX ADMIN — SODIUM CHLORIDE, SODIUM LACTATE, POTASSIUM CHLORIDE, AND CALCIUM CHLORIDE: .6; .31; .03; .02 INJECTION, SOLUTION INTRAVENOUS at 07:48

## 2025-05-14 ASSESSMENT — PAIN - FUNCTIONAL ASSESSMENT
PAIN_FUNCTIONAL_ASSESSMENT: NONE - DENIES PAIN
PAIN_FUNCTIONAL_ASSESSMENT: NONE - DENIES PAIN
PAIN_FUNCTIONAL_ASSESSMENT: 0-10
PAIN_FUNCTIONAL_ASSESSMENT: NONE - DENIES PAIN

## 2025-05-14 NOTE — DISCHARGE INSTRUCTIONS
Keep right arm elevated, dressing dry, f/u 1 week    ORTHOPEDIC DISCHARGE INSTRUCTIONS    Follow your surgeons instructions.  Make follow-up appointment.  Observe operative area for signs of excessive bleeding such as a slow general ooze that saturates the dressing or bright red bleeding. In either case, apply pressure to the area and elevate if possible and call your surgeon right away.  Observe the affected extremity for circulation or nerve impairment such as a change in color, numbness, tingling, coldness or increased pain. If any of these symptoms are present call your surgeon.  Observe operative site for any signs of infection such as increased pain, redness, fever greater than 101 degrees, swelling, foul odor or drainage. Contact surgeon if any of these symptoms are present.  If you become short of breath call your surgeon or go to the nearest emergency room.  Remove dressing if directed by surgeon. Leave steristips or sutures or staples in place.  You may loosen your ace wrap if it feels too tight, or if you have severe pain, or if it has swelling.  Elevate extremity as directed by surgeon.  You may shower when directed by surgeon.  Use ice pack as directed by surgeon. Do not use heat.  Avoid stress to suture line such as pulling, pushing or tugging.  Use sling as instructed by surgeon.   Take medications as ordered.Take pain medication with food.Do not drive or operate machinery while taking narcotics.  Call your surgeon for any questions or problems.    ANESTHESIA DISCHARGE INSTRUCTIONS    Wear your seatbelt home.  You are under the influence of drugs-do not drink alcohol, drive, operate machinery, make any important decisions or sign any legal documents for 24 hours.  Children should not ride bikes, skate boards or play on gym sets for 24 hours after surgery.  A responsible adult needs to be with you for 24 hours.  You may experience lightheadedness, dizziness, or sleepiness following surgery.  Rest at

## 2025-05-14 NOTE — ANESTHESIA POSTPROCEDURE EVALUATION
Department of Anesthesiology  Postprocedure Note    Patient: Arvind Buchanan  MRN: [id number]  YOB: 1945  Date of evaluation: 5/14/2025    Procedure Summary       Date: 05/14/25 Room / Location: [address]    Anesthesia Start: 0800 Anesthesia Stop: 0917    Procedure: EXPLORATION RIGHT ELBOW AND DECOMPRESSION RIGHT ULNA NERVE, RIGHT LONG TRIGGER FINGER RELEASE (Right) Diagnosis:       Lesion of ulnar nerve, right upper limb      (Lesion of ulnar nerve, right upper limb [G56.21])    Surgeons: Hallie Henry MD Responsible Provider: Ferdinand Nash DO    Anesthesia Type: general ASA Status: 4            Anesthesia Type: No value filed.    Chasity Phase I: Chasity Score: 3    Chasity Phase II:      Anesthesia Post Evaluation    Patient location during evaluation: PACU  Patient participation: complete - patient participated  Level of consciousness: awake  Pain score: 0  Airway patency: patent  Nausea & Vomiting: no nausea and no vomiting  Cardiovascular status: blood pressure returned to baseline  Respiratory status: acceptable  Hydration status: euvolemic  Multimodal analgesia pain management approach  Pain management: adequate    No notable events documented.

## 2025-05-14 NOTE — ANESTHESIA PRE PROCEDURE
Department of Anesthesiology  Preprocedure Note       Name:  Arvind Buchanan   Age:  79 y.o.  :  1945                                          MRN:  [id number]         Date:  2025      Surgeon: Surgeon(s):  Hallie Henry MD    Procedure: Procedure(s):  EXPLORATION RIGHT ELBOW AND DECOMPRESSION RIGHT ULNA NERVE    Medications prior to admission:   Prior to Admission medications    Medication Sig Start Date End Date Taking? Authorizing Provider   atorvastatin (LIPITOR) 80 MG tablet Take 1 tablet by mouth nightly 8/10/24  Yes Dayron Gongora MD   lisinopril (PRINIVIL;ZESTRIL) 2.5 MG tablet Take 1 tablet by mouth daily 3/20/25  Yes Dayron Gongora MD   pantoprazole (PROTONIX) 40 MG tablet Oral for 30 Days 24  Yes Dayron Gongora MD   metoprolol succinate (TOPROL XL) 25 MG extended release tablet Take 1 tablet by mouth nightly 8/10/24  Yes Dayron Gongora MD   dolutegravir-lamiVUDine (DOVATO)  MG per tablet Take 1 tablet by mouth daily 25  Yes ProviderDayron MD   aspirin 325 MG EC tablet Take 1 tablet by mouth daily (with breakfast) 24  Yes ProviderDayron MD   cetirizine (ZYRTEC ALLERGY) 10 MG tablet Take 1 tablet by mouth daily 3/1/22   ProviderDayron MD   Multiple Vitamin (MULTI-VITAMIN PO) Take by mouth    ProviderDayron MD   Bacillus Coagulans-Inulin (PROBIOTIC) 1-250 BILLION-MG CAPS Take by mouth    Dayron Gongora MD   Cyanocobalamin (B-12) 1000 MCG TABS Take by mouth    ProviderDayron MD   Zinc Sulfate (ZINC 15 PO) Take by mouth    ProviderDayron MD   Biotin 5 MG CAPS Take by mouth    ProviderDayron MD   vitamin C (ASCORBIC ACID) 500 MG tablet Take 2 tablets by mouth daily    Dayron Gongora MD   triamcinolone (KENALOG) 0.1 % cream Apply topically  Daily.Apply with Q tip in AS nightly 10/28/19   Rony Burris MD   zolpidem (AMBIEN) 10 MG tablet  19   Dayron Gongora MD   dicyclomine

## 2025-05-14 NOTE — OP NOTE
[address] [address]                            OPERATIVE REPORT      PATIENT NAME: JENNIFER ACEVEDO               : 1945  MED REC NO: [id number]                      ROOM: ASC OR  ACCOUNT NO: [id number]                       ADMIT DATE: 2025  PROVIDER: Hallie Henry MD      DATE OF PROCEDURE:  2025    SURGEON:  Hallie Henry MD    PREOPERATIVE DIAGNOSES:  Right long trigger finger as well as possible recurrence of cubital tunnel syndrome.    POSTOPERATIVE DIAGNOSES:  Right long trigger finger as well as possible recurrence of cubital tunnel syndrome.    PROCEDURE:  Release of A1 pulley right long finger as well as exploration of right ulnar nerve.    INDICATION:  This is a 79-year-old white male who about a year ago underwent right cubital tunnel submuscular transposition.  The patient states that the pain has resolved, but continues to complain of persistent paresthesias.  An EMG nerve conduction study did confirm persistent cubital tunnel syndrome and therefore my recommendation is that he undergo exploration to see if there is some scar contracture, which may still have caused some impingement.  The issue of bleeding, infection, scarring, as well as the issue of persistent symptomatology was discussed and consent was obtained.    DESCRIPTION OF PROCEDURE:  The patient was taken to the operating room on , placed in supine position, at which time general anesthesia was obtained.  The hand was sterilely prepped and draped in usual fashion using ChloraPrep solution.  We initially addressed the trigger finger.  Incision was made transversely over the volar aspect of his right long trigger finger.  The A1 pulley identified and easily incised.  No other compression was noted.  I closed with 5-0 nylon and after infiltration with 0.5% plain Marcaine.    We then made the previous incision for the cubital tunnel.  After  tunnel.  After difficulty finding the proximal aspect of the ulnar nerve I did extend this proximally until the ulnar nerve was identified.  I then traced this into the previously placed submuscular tunnel.  I did not see any areas of compression and the nerve was intact, and again no areas of compression was noted.  I then simply irrigated with saline and after infiltration of 0.5% plain Marcaine, closed using 2-0 Vicryl and 3-0 Monocryl.  Steri-Strips as well as soft dressing was then placed, secured with an Ace wrap.  The patient was taken to recovery room in satisfactory condition.  No complications were noted.  At the end of the procedure sponge, needle, and instrument counts were entirely correct.    Instructed to keep the arm elevated at all times.  Follow up in 1 week.  Given script for Norco as needed.  Follow up again in 1 week.          SERENITY WHEELER MD      D:  05/14/2025 09:08:30     T:  05/14/2025 09:38:07     DOETTE/GEETA  Job #:  [id number]     Doc#:  [id number]

## 2025-05-14 NOTE — BRIEF OP NOTE
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]    Post-Op Diagnosis: Same       Procedure(s):  EXPLORATION RIGHT ELBOW AND DECOMPRESSION RIGHT ULNA NERVE, RIGHT LONG TRIGGER FINGER RELEASE    Surgeon(s):  Hallie Henry MD    Assistant:  Surgical Assistant: Samira Barnes    Anesthesia: General    Estimated Blood Loss (mL): Minimal    Complications: None    Specimens:   * No specimens in log *    Implants:  * No implants in log *      Drains: * No LDAs found *    Findings:  Infection Present At Time Of Surgery (PATOS) (choose all levels that have infection present):  No infection present  Other Findings:     Electronically signed by Hallie Henry MD on 5/14/2025 at 9:03 AM

## 2025-09-02 ENCOUNTER — PROCEDURE VISIT (OUTPATIENT)
Dept: AUDIOLOGY | Age: 80
End: 2025-09-02
Payer: MEDICARE

## 2025-09-02 ENCOUNTER — OFFICE VISIT (OUTPATIENT)
Dept: ENT CLINIC | Age: 80
End: 2025-09-02
Payer: MEDICARE

## 2025-09-02 VITALS
WEIGHT: 153 LBS | TEMPERATURE: 97.7 F | SYSTOLIC BLOOD PRESSURE: 148 MMHG | DIASTOLIC BLOOD PRESSURE: 65 MMHG | OXYGEN SATURATION: 98 % | HEART RATE: 69 BPM | BODY MASS INDEX: 20.72 KG/M2 | HEIGHT: 72 IN

## 2025-09-02 DIAGNOSIS — H90.3 SENSORINEURAL HEARING LOSS (SNHL) OF BOTH EARS: ICD-10-CM

## 2025-09-02 DIAGNOSIS — R42 DIZZINESS AND GIDDINESS: ICD-10-CM

## 2025-09-02 DIAGNOSIS — K13.79 ACQUIRED ANOMALY OF UVULA: ICD-10-CM

## 2025-09-02 DIAGNOSIS — R42 LIGHTHEADEDNESS: ICD-10-CM

## 2025-09-02 DIAGNOSIS — H90.3 SENSORINEURAL HEARING LOSS (SNHL) OF BOTH EARS: Primary | ICD-10-CM

## 2025-09-02 DIAGNOSIS — R42 DIZZINESS: Primary | ICD-10-CM

## 2025-09-02 PROCEDURE — 1123F ACP DISCUSS/DSCN MKR DOCD: CPT | Performed by: STUDENT IN AN ORGANIZED HEALTH CARE EDUCATION/TRAINING PROGRAM

## 2025-09-02 PROCEDURE — G8420 CALC BMI NORM PARAMETERS: HCPCS | Performed by: STUDENT IN AN ORGANIZED HEALTH CARE EDUCATION/TRAINING PROGRAM

## 2025-09-02 PROCEDURE — 92567 TYMPANOMETRY: CPT | Performed by: AUDIOLOGIST

## 2025-09-02 PROCEDURE — G8428 CUR MEDS NOT DOCUMENT: HCPCS | Performed by: AUDIOLOGIST

## 2025-09-02 PROCEDURE — 3078F DIAST BP <80 MM HG: CPT | Performed by: STUDENT IN AN ORGANIZED HEALTH CARE EDUCATION/TRAINING PROGRAM

## 2025-09-02 PROCEDURE — 99203 OFFICE O/P NEW LOW 30 MIN: CPT | Performed by: STUDENT IN AN ORGANIZED HEALTH CARE EDUCATION/TRAINING PROGRAM

## 2025-09-02 PROCEDURE — 1036F TOBACCO NON-USER: CPT | Performed by: STUDENT IN AN ORGANIZED HEALTH CARE EDUCATION/TRAINING PROGRAM

## 2025-09-02 PROCEDURE — 3077F SYST BP >= 140 MM HG: CPT | Performed by: STUDENT IN AN ORGANIZED HEALTH CARE EDUCATION/TRAINING PROGRAM

## 2025-09-02 PROCEDURE — 92557 COMPREHENSIVE HEARING TEST: CPT | Performed by: AUDIOLOGIST

## 2025-09-02 PROCEDURE — 1159F MED LIST DOCD IN RCRD: CPT | Performed by: STUDENT IN AN ORGANIZED HEALTH CARE EDUCATION/TRAINING PROGRAM

## 2025-09-02 PROCEDURE — G8427 DOCREV CUR MEDS BY ELIG CLIN: HCPCS | Performed by: STUDENT IN AN ORGANIZED HEALTH CARE EDUCATION/TRAINING PROGRAM

## 2025-09-04 ENCOUNTER — HOSPITAL ENCOUNTER (OUTPATIENT)
Dept: PHYSICAL THERAPY | Age: 80
Setting detail: THERAPIES SERIES
Discharge: HOME | End: 2025-09-04
Payer: MEDICARE

## 2025-09-04 PROCEDURE — 97530 THERAPEUTIC ACTIVITIES: CPT

## 2025-09-04 PROCEDURE — 97163 PT EVAL HIGH COMPLEX 45 MIN: CPT

## (undated) DEVICE — UPPER EXTREMTY: Brand: MEDLINE INDUSTRIES, INC.

## (undated) DEVICE — SYRINGE MED 10ML LUERLOCK TIP W/O SFTY DISP

## (undated) DEVICE — TOWEL,OR,DSP,ST,BLUE,STD,4/PK,20PK/CS: Brand: MEDLINE

## (undated) DEVICE — 3M™ STERI-STRIP™ REINFORCED ADHESIVE SKIN CLOSURES, R1547, 1/2 IN X 4 IN (12 MM X 100 MM), 6 STRIPS/ENVELOPE: Brand: 3M™ STERI-STRIP™

## (undated) DEVICE — ZIMMER® STERILE DISPOSABLE TOURNIQUET CUFF WITH PLC, DUAL PORT, SINGLE BLADDER, 18 IN. (46 CM)

## (undated) DEVICE — BANDAGE,GAUZE,CONFORMING,4"X75",STRL,LF: Brand: MEDLINE

## (undated) DEVICE — DRAPE HND W114XL142IN BLU POLYPR W O PCH FEN CRD AND TB HLDR

## (undated) DEVICE — GOWN SIRUS NONREIN LG W/TWL: Brand: MEDLINE INDUSTRIES, INC.

## (undated) DEVICE — SUTURE VICRYL + SZ 2-0 L27IN ABSRB WHT SH 1/2 CIR TAPERCUT VCP417H

## (undated) DEVICE — ELECTRODE PT RET AD L9FT HI MOIST COND ADH HYDRGEL CORDED

## (undated) DEVICE — PAD,ABDOMINAL,8"X7.5",STERILE,LF,1/PK: Brand: MEDLINE

## (undated) DEVICE — MASTISOL ADHESIVE LIQ 2/3ML

## (undated) DEVICE — GAUZE,SPONGE,4"X4",16PLY,STRL,LF,10/TRAY: Brand: MEDLINE

## (undated) DEVICE — SUTURE VICRYL + SZ 3-0 L27IN ABSRB UD L26MM SH 1/2 CIR VCP416H

## (undated) DEVICE — PAD,ABDOMINAL,8"X10",ST,LF: Brand: MEDLINE

## (undated) DEVICE — SOLUTION IRRIG 500ML 0.9% SOD CHLO USP POUR PLAS BTL

## (undated) DEVICE — SUTURE VICRYL SZ 4-0 L18IN ABSRB UD POLYGLACTIN 910 BRAID TIE J643H

## (undated) DEVICE — SUTURE MONOCRYL SZ 3 0 L18IN ABSRB UD PS 2 3 8 CIR REV CUT NDL MCP497G

## (undated) DEVICE — INTENDED FOR TISSUE SEPARATION, AND OTHER PROCEDURES THAT REQUIRE A SHARP SURGICAL BLADE TO PUNCTURE OR CUT.: Brand: BARD-PARKER ® STAINLESS STEEL BLADES

## (undated) DEVICE — LOOP,VESSEL,MAXI,BLUE,2/PK,STERILE: Brand: MEDLINE

## (undated) DEVICE — SUTURE VICRYL + SZ 2-0 L18IN ABSRB UD CT1 L36MM 1/2 CIR VCP839D

## (undated) DEVICE — GLOVE SURG SZ 65 THK91MIL LTX FREE SYN POLYISOPRENE

## (undated) DEVICE — SUTURE NONABSORBABLE MONOFILAMENT 5-0 FS-2 18 IN ETHILON 661G

## (undated) DEVICE — BLANKET WRM W40.2XL55.9IN IORT LO BODY + MISTRAL AIR

## (undated) DEVICE — HYPODERMIC SAFETY NEEDLE: Brand: MAGELLAN

## (undated) DEVICE — BANDAGE,GAUZE,BULKEE II,4.5"X4.1YD,STRL: Brand: MEDLINE

## (undated) DEVICE — BLADE CLIPPER GEN PURP NS